# Patient Record
Sex: FEMALE | Race: BLACK OR AFRICAN AMERICAN | NOT HISPANIC OR LATINO | Employment: OTHER | URBAN - METROPOLITAN AREA
[De-identification: names, ages, dates, MRNs, and addresses within clinical notes are randomized per-mention and may not be internally consistent; named-entity substitution may affect disease eponyms.]

---

## 2023-03-29 ENCOUNTER — TELEPHONE (OUTPATIENT)
Dept: PRIMARY CARE | Facility: CLINIC | Age: 48
End: 2023-03-29
Payer: MEDICARE

## 2023-03-29 NOTE — TELEPHONE ENCOUNTER
Pt came into office and dropped off disability paper work, placed in [facility] mailbox 3/29. Pt states she has a deadline of 4/7 for this to be completed.

## 2023-04-26 ENCOUNTER — TELEPHONE (OUTPATIENT)
Dept: PRIMARY CARE | Facility: CLINIC | Age: 48
End: 2023-04-26
Payer: MEDICARE

## 2023-04-26 DIAGNOSIS — M06.9 RHEUMATOID ARTHRITIS INVOLVING BOTH KNEES, UNSPECIFIED WHETHER RHEUMATOID FACTOR PRESENT (MULTI): Primary | ICD-10-CM

## 2023-04-26 PROBLEM — R20.2 NUMBNESS AND TINGLING: Status: ACTIVE | Noted: 2023-04-26

## 2023-04-26 PROBLEM — S89.90XA KNEE INJURY: Status: ACTIVE | Noted: 2023-04-26

## 2023-04-26 PROBLEM — D12.6 ADENOMATOUS POLYP OF COLON: Status: ACTIVE | Noted: 2023-04-26

## 2023-04-26 PROBLEM — M25.561 BILATERAL KNEE PAIN: Status: ACTIVE | Noted: 2023-04-26

## 2023-04-26 PROBLEM — R29.898 WEAKNESS OF BOTH LOWER EXTREMITIES: Status: ACTIVE | Noted: 2023-04-26

## 2023-04-26 PROBLEM — R20.0 NUMBNESS AND TINGLING: Status: ACTIVE | Noted: 2023-04-26

## 2023-04-26 PROBLEM — E55.9 VITAMIN D DEFICIENCY: Status: ACTIVE | Noted: 2023-04-26

## 2023-04-26 PROBLEM — E53.8 VITAMIN B12 DEFICIENCY: Status: ACTIVE | Noted: 2023-04-26

## 2023-04-26 PROBLEM — R63.2 POLYPHAGIA: Status: ACTIVE | Noted: 2023-04-26

## 2023-04-26 PROBLEM — D64.9 ANEMIA: Status: ACTIVE | Noted: 2023-04-26

## 2023-04-26 PROBLEM — M77.32 CALCANEAL SPUR OF LEFT FOOT: Status: ACTIVE | Noted: 2023-04-26

## 2023-04-26 PROBLEM — R09.81 SINUS CONGESTION: Status: ACTIVE | Noted: 2023-04-26

## 2023-04-26 PROBLEM — K52.9 CHRONIC DIARRHEA: Status: ACTIVE | Noted: 2023-04-26

## 2023-04-26 PROBLEM — R29.898 IMPAIRED STRENGTH OF LOWER EXTREMITY: Status: ACTIVE | Noted: 2023-04-26

## 2023-04-26 PROBLEM — T14.8XXA BRUISE: Status: ACTIVE | Noted: 2023-04-26

## 2023-04-26 PROBLEM — F32.A DEPRESSION: Status: ACTIVE | Noted: 2023-04-26

## 2023-04-26 PROBLEM — E66.01 OBESITY, CLASS III, BMI 40-49.9 (MORBID OBESITY) (MULTI): Status: ACTIVE | Noted: 2023-04-26

## 2023-04-26 PROBLEM — R10.811 RIGHT UPPER QUADRANT ABDOMINAL TENDERNESS: Status: ACTIVE | Noted: 2023-04-26

## 2023-04-26 PROBLEM — M70.51 BURSITIS OF RIGHT KNEE: Status: ACTIVE | Noted: 2023-04-26

## 2023-04-26 PROBLEM — E66.813 OBESITY, CLASS III, BMI 40-49.9 (MORBID OBESITY): Status: ACTIVE | Noted: 2023-04-26

## 2023-04-26 PROBLEM — J45.909 ASTHMA (HHS-HCC): Status: ACTIVE | Noted: 2023-04-26

## 2023-04-26 PROBLEM — M79.662 TENDERNESS OF LEFT CALF: Status: ACTIVE | Noted: 2023-04-26

## 2023-04-26 PROBLEM — G89.29 CHRONIC MIDLINE LOW BACK PAIN WITHOUT SCIATICA: Status: ACTIVE | Noted: 2023-04-26

## 2023-04-26 PROBLEM — S83.249A ACUTE MEDIAL MENISCAL TEAR: Status: ACTIVE | Noted: 2023-04-26

## 2023-04-26 PROBLEM — M54.50 CHRONIC MIDLINE LOW BACK PAIN WITHOUT SCIATICA: Status: ACTIVE | Noted: 2023-04-26

## 2023-04-26 PROBLEM — M25.562 BILATERAL KNEE PAIN: Status: ACTIVE | Noted: 2023-04-26

## 2023-04-26 PROBLEM — M25.661 DECREASED RANGE OF MOTION OF RIGHT KNEE: Status: ACTIVE | Noted: 2023-04-26

## 2023-04-26 PROBLEM — R26.9 GAIT ABNORMALITY: Status: ACTIVE | Noted: 2023-04-26

## 2023-04-26 PROBLEM — K57.30 DIVERTICULOSIS OF COLON: Status: ACTIVE | Noted: 2023-04-26

## 2023-04-26 PROBLEM — M76.51 PATELLAR TENDINITIS OF RIGHT KNEE: Status: ACTIVE | Noted: 2023-04-26

## 2023-04-26 RX ORDER — ACETAMINOPHEN, DEXTROMETHORPHAN HBR, DOXYLAMINE SUCCINATE, PHENYLEPHRINE HCL 650; 20; 12.5; 1 MG/30ML; MG/30ML; MG/30ML; MG/30ML
1 SOLUTION ORAL DAILY
COMMUNITY
Start: 2019-04-05 | End: 2023-06-21 | Stop reason: SDUPTHER

## 2023-04-26 RX ORDER — VIT C/E/ZN/COPPR/LUTEIN/ZEAXAN 250MG-90MG
1 CAPSULE ORAL DAILY
COMMUNITY
Start: 2017-03-07 | End: 2023-06-21 | Stop reason: SDUPTHER

## 2023-04-26 RX ORDER — ALBUTEROL SULFATE 90 UG/1
AEROSOL, METERED RESPIRATORY (INHALATION)
COMMUNITY
End: 2023-11-21

## 2023-04-26 RX ORDER — TURMERIC ROOT EXTRACT 500 MG
TABLET ORAL
COMMUNITY

## 2023-04-26 RX ORDER — SARILUMAB 200 MG/1.14ML
INJECTION, SOLUTION SUBCUTANEOUS
COMMUNITY
Start: 2023-01-06 | End: 2023-12-21 | Stop reason: WASHOUT

## 2023-04-26 RX ORDER — DULOXETIN HYDROCHLORIDE 60 MG/1
1 CAPSULE, DELAYED RELEASE ORAL DAILY
COMMUNITY
Start: 2023-03-20

## 2023-04-26 RX ORDER — FOLIC ACID 1 MG/1
1 TABLET ORAL DAILY
COMMUNITY
End: 2023-06-21 | Stop reason: SDUPTHER

## 2023-04-26 RX ORDER — TRAZODONE HYDROCHLORIDE 100 MG/1
TABLET ORAL
COMMUNITY
Start: 2023-03-20

## 2023-05-05 SDOH — ECONOMIC STABILITY: FOOD INSECURITY: WITHIN THE PAST 12 MONTHS, THE FOOD YOU BOUGHT JUST DIDN'T LAST AND YOU DIDN'T HAVE MONEY TO GET MORE.: SOMETIMES TRUE

## 2023-05-05 SDOH — ECONOMIC STABILITY: FOOD INSECURITY: WITHIN THE PAST 12 MONTHS, YOU WORRIED THAT YOUR FOOD WOULD RUN OUT BEFORE YOU GOT MONEY TO BUY MORE.: SOMETIMES TRUE

## 2023-05-18 LAB
ALANINE AMINOTRANSFERASE (SGPT) (U/L) IN SER/PLAS: 11 U/L (ref 7–45)
ALBUMIN (G/DL) IN SER/PLAS: 3.9 G/DL (ref 3.4–5)
ALKALINE PHOSPHATASE (U/L) IN SER/PLAS: 81 U/L (ref 33–110)
ANION GAP IN SER/PLAS: 12 MMOL/L (ref 10–20)
ASPARTATE AMINOTRANSFERASE (SGOT) (U/L) IN SER/PLAS: 13 U/L (ref 9–39)
BASOPHILS (10*3/UL) IN BLOOD BY AUTOMATED COUNT: 0.03 X10E9/L (ref 0–0.1)
BASOPHILS/100 LEUKOCYTES IN BLOOD BY AUTOMATED COUNT: 0.4 % (ref 0–2)
BILIRUBIN TOTAL (MG/DL) IN SER/PLAS: 0.3 MG/DL (ref 0–1.2)
CALCIDIOL (25 OH VITAMIN D3) (NG/ML) IN SER/PLAS: 21 NG/ML
CALCIUM (MG/DL) IN SER/PLAS: 9.4 MG/DL (ref 8.6–10.3)
CARBON DIOXIDE, TOTAL (MMOL/L) IN SER/PLAS: 29 MMOL/L (ref 21–32)
CHLORIDE (MMOL/L) IN SER/PLAS: 103 MMOL/L (ref 98–107)
CHOLESTEROL (MG/DL) IN SER/PLAS: 165 MG/DL (ref 0–199)
CHOLESTEROL IN HDL (MG/DL) IN SER/PLAS: 51.2 MG/DL
CHOLESTEROL/HDL RATIO: 3.2
COBALAMIN (VITAMIN B12) (PG/ML) IN SER/PLAS: 256 PG/ML (ref 211–911)
CREATININE (MG/DL) IN SER/PLAS: 0.74 MG/DL (ref 0.5–1.05)
EOSINOPHILS (10*3/UL) IN BLOOD BY AUTOMATED COUNT: 0.14 X10E9/L (ref 0–0.7)
EOSINOPHILS/100 LEUKOCYTES IN BLOOD BY AUTOMATED COUNT: 1.8 % (ref 0–6)
ERYTHROCYTE DISTRIBUTION WIDTH (RATIO) BY AUTOMATED COUNT: 13.8 % (ref 11.5–14.5)
ERYTHROCYTE MEAN CORPUSCULAR HEMOGLOBIN CONCENTRATION (G/DL) BY AUTOMATED: 31.7 G/DL (ref 32–36)
ERYTHROCYTE MEAN CORPUSCULAR VOLUME (FL) BY AUTOMATED COUNT: 88 FL (ref 80–100)
ERYTHROCYTES (10*6/UL) IN BLOOD BY AUTOMATED COUNT: 4.39 X10E12/L (ref 4–5.2)
GFR FEMALE: >90 ML/MIN/1.73M2
GLUCOSE (MG/DL) IN SER/PLAS: 95 MG/DL (ref 74–99)
HEMATOCRIT (%) IN BLOOD BY AUTOMATED COUNT: 38.5 % (ref 36–46)
HEMOGLOBIN (G/DL) IN BLOOD: 12.2 G/DL (ref 12–16)
IMMATURE GRANULOCYTES/100 LEUKOCYTES IN BLOOD BY AUTOMATED COUNT: 0.3 % (ref 0–0.9)
LDL: 95 MG/DL (ref 0–99)
LEUKOCYTES (10*3/UL) IN BLOOD BY AUTOMATED COUNT: 7.6 X10E9/L (ref 4.4–11.3)
LYMPHOCYTES (10*3/UL) IN BLOOD BY AUTOMATED COUNT: 2.18 X10E9/L (ref 1.2–4.8)
LYMPHOCYTES/100 LEUKOCYTES IN BLOOD BY AUTOMATED COUNT: 28.8 % (ref 13–44)
MONOCYTES (10*3/UL) IN BLOOD BY AUTOMATED COUNT: 0.3 X10E9/L (ref 0.1–1)
MONOCYTES/100 LEUKOCYTES IN BLOOD BY AUTOMATED COUNT: 4 % (ref 2–10)
NEUTROPHILS (10*3/UL) IN BLOOD BY AUTOMATED COUNT: 4.91 X10E9/L (ref 1.2–7.7)
NEUTROPHILS/100 LEUKOCYTES IN BLOOD BY AUTOMATED COUNT: 64.7 % (ref 40–80)
PLATELETS (10*3/UL) IN BLOOD AUTOMATED COUNT: 320 X10E9/L (ref 150–450)
POTASSIUM (MMOL/L) IN SER/PLAS: 3.6 MMOL/L (ref 3.5–5.3)
PROTEIN TOTAL: 7.7 G/DL (ref 6.4–8.2)
SODIUM (MMOL/L) IN SER/PLAS: 140 MMOL/L (ref 136–145)
TRIGLYCERIDE (MG/DL) IN SER/PLAS: 93 MG/DL (ref 0–149)
UREA NITROGEN (MG/DL) IN SER/PLAS: 12 MG/DL (ref 6–23)
VLDL: 19 MG/DL (ref 0–40)

## 2023-05-22 ENCOUNTER — TELEPHONE (OUTPATIENT)
Dept: PRIMARY CARE | Facility: CLINIC | Age: 48
End: 2023-05-22
Payer: MEDICARE

## 2023-05-25 NOTE — TELEPHONE ENCOUNTER
Labs look ok- needs to be more consistent with vitamin D 1000 unit daily supplementation. If already taking it daily needs to take 2 capsules as vitamin D is a little low. Continue with B12 supplement. Everything else looks ok- if Dr. RAY has other issues he can discuss with her upon his return

## 2023-06-13 ENCOUNTER — TELEPHONE (OUTPATIENT)
Dept: PRIMARY CARE | Facility: CLINIC | Age: 48
End: 2023-06-13
Payer: MEDICARE

## 2023-06-13 DIAGNOSIS — E66.01 OBESITY, CLASS III, BMI 40-49.9 (MORBID OBESITY) (MULTI): ICD-10-CM

## 2023-06-13 DIAGNOSIS — M06.9 RHEUMATOID ARTHRITIS INVOLVING BOTH KNEES, UNSPECIFIED WHETHER RHEUMATOID FACTOR PRESENT (MULTI): Primary | ICD-10-CM

## 2023-06-13 NOTE — TELEPHONE ENCOUNTER
Pt called in and is requesting another referral be placed for food for life. Pt states current referral will be expiring.

## 2023-06-20 RX ORDER — HYDROXYCHLOROQUINE SULFATE 200 MG/1
200 TABLET, FILM COATED ORAL 2 TIMES DAILY
COMMUNITY
Start: 2023-02-08

## 2023-06-21 ENCOUNTER — OFFICE VISIT (OUTPATIENT)
Dept: PRIMARY CARE | Facility: CLINIC | Age: 48
End: 2023-06-21
Payer: MEDICARE

## 2023-06-21 ENCOUNTER — LAB (OUTPATIENT)
Dept: LAB | Facility: LAB | Age: 48
End: 2023-06-21
Payer: MEDICARE

## 2023-06-21 VITALS
HEIGHT: 61 IN | BODY MASS INDEX: 45.31 KG/M2 | TEMPERATURE: 96.5 F | DIASTOLIC BLOOD PRESSURE: 82 MMHG | WEIGHT: 240 LBS | HEART RATE: 62 BPM | SYSTOLIC BLOOD PRESSURE: 119 MMHG | OXYGEN SATURATION: 99 %

## 2023-06-21 DIAGNOSIS — E55.9 VITAMIN D DEFICIENCY: ICD-10-CM

## 2023-06-21 DIAGNOSIS — M06.9 RHEUMATOID ARTHRITIS INVOLVING BOTH KNEES, UNSPECIFIED WHETHER RHEUMATOID FACTOR PRESENT (MULTI): ICD-10-CM

## 2023-06-21 DIAGNOSIS — E74.89 OTHER SPECIFIED DISORDERS OF CARBOHYDRATE METABOLISM (MULTI): ICD-10-CM

## 2023-06-21 DIAGNOSIS — M06.9 RHEUMATOID ARTHRITIS, INVOLVING UNSPECIFIED SITE, UNSPECIFIED WHETHER RHEUMATOID FACTOR PRESENT (MULTI): ICD-10-CM

## 2023-06-21 DIAGNOSIS — Z86.39 HISTORY OF MORBID OBESITY: ICD-10-CM

## 2023-06-21 DIAGNOSIS — E53.8 VITAMIN B12 DEFICIENCY: ICD-10-CM

## 2023-06-21 DIAGNOSIS — E66.01 OBESITY, CLASS III, BMI 40-49.9 (MORBID OBESITY) (MULTI): Primary | ICD-10-CM

## 2023-06-21 DIAGNOSIS — E66.01 OBESITY, CLASS III, BMI 40-49.9 (MORBID OBESITY) (MULTI): ICD-10-CM

## 2023-06-21 LAB
ALANINE AMINOTRANSFERASE (SGPT) (U/L) IN SER/PLAS: 13 U/L (ref 7–45)
ALBUMIN (G/DL) IN SER/PLAS: 4 G/DL (ref 3.4–5)
ALBUMIN (MG/L) IN URINE: 22.5 MG/L
ALBUMIN/CREATININE (UG/MG) IN URINE: 16.1 UG/MG CRT (ref 0–30)
ALKALINE PHOSPHATASE (U/L) IN SER/PLAS: 90 U/L (ref 33–110)
ANION GAP IN SER/PLAS: 11 MMOL/L (ref 10–20)
ASPARTATE AMINOTRANSFERASE (SGOT) (U/L) IN SER/PLAS: 14 U/L (ref 9–39)
BILIRUBIN TOTAL (MG/DL) IN SER/PLAS: 0.3 MG/DL (ref 0–1.2)
CALCIUM (MG/DL) IN SER/PLAS: 9.2 MG/DL (ref 8.6–10.3)
CARBON DIOXIDE, TOTAL (MMOL/L) IN SER/PLAS: 27 MMOL/L (ref 21–32)
CHLORIDE (MMOL/L) IN SER/PLAS: 106 MMOL/L (ref 98–107)
CREATININE (MG/DL) IN SER/PLAS: 0.73 MG/DL (ref 0.5–1.05)
CREATININE (MG/DL) IN URINE: 140 MG/DL (ref 20–320)
GFR FEMALE: >90 ML/MIN/1.73M2
GLUCOSE (MG/DL) IN SER/PLAS: 89 MG/DL (ref 74–99)
POTASSIUM (MMOL/L) IN SER/PLAS: 4 MMOL/L (ref 3.5–5.3)
PROTEIN TOTAL: 7.2 G/DL (ref 6.4–8.2)
SODIUM (MMOL/L) IN SER/PLAS: 140 MMOL/L (ref 136–145)
THYROTROPIN (MIU/L) IN SER/PLAS BY DETECTION LIMIT <= 0.05 MIU/L: 1.56 MIU/L (ref 0.44–3.98)
UREA NITROGEN (MG/DL) IN SER/PLAS: 11 MG/DL (ref 6–23)

## 2023-06-21 PROCEDURE — 82570 ASSAY OF URINE CREATININE: CPT

## 2023-06-21 PROCEDURE — 36415 COLL VENOUS BLD VENIPUNCTURE: CPT

## 2023-06-21 PROCEDURE — 80053 COMPREHEN METABOLIC PANEL: CPT

## 2023-06-21 PROCEDURE — 84443 ASSAY THYROID STIM HORMONE: CPT

## 2023-06-21 PROCEDURE — 99214 OFFICE O/P EST MOD 30 MIN: CPT | Performed by: INTERNAL MEDICINE

## 2023-06-21 PROCEDURE — 83036 HEMOGLOBIN GLYCOSYLATED A1C: CPT

## 2023-06-21 PROCEDURE — 82043 UR ALBUMIN QUANTITATIVE: CPT

## 2023-06-21 PROCEDURE — 82652 VIT D 1 25-DIHYDROXY: CPT

## 2023-06-21 PROCEDURE — 3008F BODY MASS INDEX DOCD: CPT | Performed by: INTERNAL MEDICINE

## 2023-06-21 RX ORDER — SEMAGLUTIDE 0.25 MG/.5ML
0.25 INJECTION, SOLUTION SUBCUTANEOUS
Qty: 2 ML | Refills: 3 | Status: SHIPPED | OUTPATIENT
Start: 2023-06-21 | End: 2023-12-21 | Stop reason: RX

## 2023-06-21 RX ORDER — VIT C/E/ZN/COPPR/LUTEIN/ZEAXAN 250MG-90MG
25 CAPSULE ORAL DAILY
Qty: 30 CAPSULE | Refills: 3 | Status: SHIPPED | OUTPATIENT
Start: 2023-06-21 | End: 2023-12-21 | Stop reason: SDUPTHER

## 2023-06-21 RX ORDER — FOLIC ACID 1 MG/1
1 TABLET ORAL DAILY
Qty: 30 TABLET | Refills: 3 | Status: SHIPPED | OUTPATIENT
Start: 2023-06-21 | End: 2023-12-21 | Stop reason: SDUPTHER

## 2023-06-21 RX ORDER — ACETAMINOPHEN, DEXTROMETHORPHAN HBR, DOXYLAMINE SUCCINATE, PHENYLEPHRINE HCL 650; 20; 12.5; 1 MG/30ML; MG/30ML; MG/30ML; MG/30ML
1 SOLUTION ORAL DAILY
Qty: 30 MCG | Refills: 3 | Status: SHIPPED | OUTPATIENT
Start: 2023-06-21 | End: 2023-12-21 | Stop reason: SDUPTHER

## 2023-06-21 SDOH — ECONOMIC STABILITY: FOOD INSECURITY: WITHIN THE PAST 12 MONTHS, YOU WORRIED THAT YOUR FOOD WOULD RUN OUT BEFORE YOU GOT MONEY TO BUY MORE.: NEVER TRUE

## 2023-06-21 SDOH — ECONOMIC STABILITY: FOOD INSECURITY: WITHIN THE PAST 12 MONTHS, THE FOOD YOU BOUGHT JUST DIDN'T LAST AND YOU DIDN'T HAVE MONEY TO GET MORE.: NEVER TRUE

## 2023-06-21 ASSESSMENT — PATIENT HEALTH QUESTIONNAIRE - PHQ9
7. TROUBLE CONCENTRATING ON THINGS, SUCH AS READING THE NEWSPAPER OR WATCHING TELEVISION: MORE THAN HALF THE DAYS
2. FEELING DOWN, DEPRESSED OR HOPELESS: SEVERAL DAYS
1. LITTLE INTEREST OR PLEASURE IN DOING THINGS: MORE THAN HALF THE DAYS
10. IF YOU CHECKED OFF ANY PROBLEMS, HOW DIFFICULT HAVE THESE PROBLEMS MADE IT FOR YOU TO DO YOUR WORK, TAKE CARE OF THINGS AT HOME, OR GET ALONG WITH OTHER PEOPLE: VERY DIFFICULT
5. POOR APPETITE OR OVEREATING: MORE THAN HALF THE DAYS
6. FEELING BAD ABOUT YOURSELF - OR THAT YOU ARE A FAILURE OR HAVE LET YOURSELF OR YOUR FAMILY DOWN: MORE THAN HALF THE DAYS
3. TROUBLE FALLING OR STAYING ASLEEP: MORE THAN HALF THE DAYS
SUM OF ALL RESPONSES TO PHQ QUESTIONS 1-9: 17
9. THOUGHTS THAT YOU WOULD BE BETTER OFF DEAD, OR OF HURTING YOURSELF: MORE THAN HALF THE DAYS
8. MOVING OR SPEAKING SO SLOWLY THAT OTHER PEOPLE COULD HAVE NOTICED. OR THE OPPOSITE, BEING SO FIGETY OR RESTLESS THAT YOU HAVE BEEN MOVING AROUND A LOT MORE THAN USUAL: MORE THAN HALF THE DAYS
SUM OF ALL RESPONSES TO PHQ9 QUESTIONS 1 & 2: 3
4. FEELING TIRED OR HAVING LITTLE ENERGY: MORE THAN HALF THE DAYS

## 2023-06-21 ASSESSMENT — LIFESTYLE VARIABLES
HOW OFTEN DO YOU HAVE A DRINK CONTAINING ALCOHOL: NEVER
SKIP TO QUESTIONS 9-10: 1
AUDIT-C TOTAL SCORE: 0
HOW OFTEN DO YOU HAVE SIX OR MORE DRINKS ON ONE OCCASION: NEVER
HOW MANY STANDARD DRINKS CONTAINING ALCOHOL DO YOU HAVE ON A TYPICAL DAY: PATIENT DOES NOT DRINK

## 2023-06-21 NOTE — PROGRESS NOTES
"Chief Complaint/HPI:    right knee pain: Following with Dr. Harmon. Has had multiple injections. Plan to do arthroplasty after patient's BMI <40. However, they are hesitant due to patient's age. She is saving up to try \"gene therapy\" in [place]. Taking Tylenol for pain. She has seen pain management. Has been through PT. She has silver sneakers and goes to the gym when the weather is better. Patient continues to have knee pain. She does go to nutritionist. \"being in pain sucks\"     RA: patient is taking Plaquenil per Dr Lundy now, is taking folate also.  She also has osteoarthritis, she also has sarcoidosis apparently, patient sees orthopedics also     Obesity: has been trying to lose weight, she has ongoing knee pain, weight has been stable. She is eating better with the [employer] program through . She has lost 6#, she does wonder about weight loss meds, she has been trying to lose more weight       Depression:  She is going to [facility], sees Alyssa Avitia        Mammogram: 11/9/22  OB/GYN: Dr. Gonzalez    ROS otherwise negative aside from what was mentioned above in HPI.      Patient Active Problem List   Diagnosis    Acute medial meniscal tear    Adenomatous polyp of colon    Anemia    Arthritis or polyarthritis, rheumatoid (CMS/HCC)    Asthma    Bilateral knee pain    Bruise    Bursitis of right knee    Calcaneal spur of left foot    Chronic diarrhea    Chronic midline low back pain without sciatica    Decreased range of motion of right knee    Depression    Diverticulosis of colon    Gait abnormality    Impaired strength of lower extremity    Knee injury    Numbness and tingling    Obesity, Class III, BMI 40-49.9 (morbid obesity) (CMS/HCC)    Patellar tendinitis of right knee    Polyphagia    Rheumatoid arthritis involving both knees (CMS/HCC)    Right upper quadrant abdominal tenderness    Sinus congestion    Tenderness of left calf    Vitamin B12 deficiency    Vitamin D deficiency    Weakness of both " lower extremities         Past Medical History:   Diagnosis Date    Acute medial meniscal tear 04/26/2023    Adenomatous polyp of colon 04/26/2023    Anemia 04/26/2023    Arthritis or polyarthritis, rheumatoid (CMS/[facility]) 04/26/2023    Asthma 04/26/2023    Bilateral knee pain 04/26/2023    Bruise 04/26/2023    Bursitis of right knee 04/26/2023    Calcaneal spur of left foot 04/26/2023    Chronic diarrhea 04/26/2023    Chronic midline low back pain without sciatica 04/26/2023    Decreased range of motion of right knee 04/26/2023    Depression 04/26/2023    Diverticulosis of colon 04/26/2023    Gait abnormality 04/26/2023    Impaired strength of lower extremity 04/26/2023    Knee injury 04/26/2023    Numbness and tingling 04/26/2023    Obesity, Class III, BMI 40-49.9 (morbid obesity) (CMS/[facility]) 04/26/2023    Patellar tendinitis of right knee 04/26/2023    Polyphagia 04/26/2023    Rheumatoid arthritis involving both knees (CMS/[facility]) 04/26/2023    Right upper quadrant abdominal tenderness 04/26/2023    Sinus congestion 04/26/2023    Tenderness of left calf 04/26/2023    Vitamin B12 deficiency 04/26/2023    Vitamin D deficiency 04/26/2023    Weakness of both lower extremities 04/26/2023     Past Surgical History:   Procedure Laterality Date    CHOLECYSTECTOMY  09/23/2016    Cholecystectomy    DILATION AND CURETTAGE OF UTERUS  09/23/2016    Dilation And Curettage    OTHER SURGICAL HISTORY  09/23/2016    Ear Surgery    OTHER SURGICAL HISTORY  09/23/2016    Oral Surgery    OTHER SURGICAL HISTORY  06/25/2020    Knee arthroscopy     Social History     Social History Narrative    Not on file         ALLERGIES  Naproxen, Sulfamethoxazole-trimethoprim, Watermelon, Meperidine, and Promethazine      MEDICATIONS  Current Outpatient Medications on File Prior to Visit   Medication Sig Dispense Refill    albuterol 90 mcg/actuation inhaler INHALE 1 (ONE) PUFF BY MOUTH EVERY FOUR HOURS AS NEEDED.      DULoxetine (Cymbalta) 60 mg DR capsule  "Take 1 capsule (60 mg) by mouth once daily. Unknown of up dosage      hydroxychloroquine (Plaquenil) 200 mg tablet Take 1 tablet (200 mg) by mouth 2 times a day.      traZODone (Desyrel) 100 mg tablet TAKE 1/2 (ONE-HALF) TO 1 (ONE) TABLET BY MOUTH FOR HEADACHE AS NEEDED for sleep      turmeric root extract 500 mg tablet Take by mouth.      [DISCONTINUED] cholecalciferol (Vitamin D-3) 25 MCG (1000 UT) capsule Take 1 capsule (25 mcg) by mouth once daily.      [DISCONTINUED] cyanocobalamin, vitamin B-12, (Vitamin B-12) 1,000 mcg tablet extended release Take 1,000 mcg by mouth once daily.      [DISCONTINUED] folic acid (Folvite) 1 mg tablet Take 1 tablet (1 mg) by mouth once daily.      Kevzara 200 mg/1.14 mL injection        No current facility-administered medications on file prior to visit.         PHYSICAL EXAM  /82 (BP Location: Right arm, Patient Position: Sitting, BP Cuff Size: Adult)   Pulse 62   Temp 35.8 °C (96.5 °F) (Temporal)   Ht 1.549 m (5' 1\")   Wt 109 kg (240 lb)   SpO2 99%   BMI 45.35 kg/m²   Body mass index is 45.35 kg/m².  CONSTITUTIONAL - morbidly obese, well nourished, well developed, looks like stated age, in no acute distress, not ill-appearing, and not tired appearing  HEAD - no trauma, normocephalic  EYES - extraocular muscles are intact, and normal external exam  NECK - supple without rigidity, no neck mass was observed, no thyromegaly or thyroid nodules  CHEST - clear to auscultation, no wheezing, no crackles and no rales, good effort  CARDIAC - regular rate and regular rhythm, no skipped beats, no murmur  EXTREMITIES - no edema, no deformities  NEUROLOGICAL - alert, oriented and no focal signs  PSYCHIATRIC - mood congruent with affect, fair eye contact, insight and judgement intact; alert, pleasant and cordial, age-appropriate  IMMUNOLOGIC - no cervical lymphadenopathy    ASSESSMENT/PLAN  Problem List Items Addressed This Visit       Arthritis or polyarthritis, rheumatoid " (CMS/[facility])    Current Assessment & Plan     Patient sees rheumatology, no med changes, continue to encourage the weight loss         Obesity, Class III, BMI 40-49.9 (morbid obesity) (CMS/[facility]) - Primary    Current Assessment & Plan     Trial of Wegovy ordered for the patient, will see if it helpful         Relevant Medications    semaglutide, weight loss, (Wegovy) 0.25 mg/0.5 mL pen injector    Other Relevant Orders    Referral to [facility]    Rheumatoid arthritis involving both knees (CMS/[facility])    Vitamin B12 deficiency    Relevant Medications    cyanocobalamin, vitamin B-12, (Vitamin B-12) 1,000 mcg tablet extended release    folic acid (Folvite) 1 mg tablet    Vitamin D deficiency    Relevant Medications    cholecalciferol (Vitamin D-3) 25 MCG (1000 UT) capsule    cyanocobalamin, vitamin B-12, (Vitamin B-12) 1,000 mcg tablet extended release    folic acid (Folvite) 1 mg tablet     Try the Wegovy, notify the office if it is not tolerated, [facility] may help the patient obtain prior authorization if needed  Serge Marie MD

## 2023-06-21 NOTE — TELEPHONE ENCOUNTER
Called pt, pt stated referral is already . Pt states new referral has to be placed every 6 months and she was told by the food for life program that she is needing a new referral in order to continue the program.

## 2023-06-22 LAB
ESTIMATED AVERAGE GLUCOSE FOR HBA1C: 131 MG/DL
HEMOGLOBIN A1C/HEMOGLOBIN TOTAL IN BLOOD: 6.2 %

## 2023-06-26 LAB — VITAMIN D 1,25-DIHYDROXY: 65.7 PG/ML (ref 19.9–79.3)

## 2023-09-23 PROBLEM — M79.672 CHRONIC FOOT PAIN, LEFT: Status: ACTIVE | Noted: 2023-09-23

## 2023-09-23 PROBLEM — Z72.4 INAPPROPRIATE DIET AND EATING HABITS: Status: ACTIVE | Noted: 2023-09-23

## 2023-09-23 PROBLEM — M25.661 DECREASED RANGE OF MOTION OF BOTH KNEES: Status: ACTIVE | Noted: 2023-09-23

## 2023-09-23 PROBLEM — G89.29 CHRONIC FOOT PAIN, LEFT: Status: ACTIVE | Noted: 2023-09-23

## 2023-09-23 PROBLEM — E66.9 OBESITY (BMI 35.0-39.9 WITHOUT COMORBIDITY): Status: ACTIVE | Noted: 2021-11-08

## 2023-09-23 PROBLEM — J45.20 MILD INTERMITTENT ASTHMA WITHOUT COMPLICATION (HHS-HCC): Status: ACTIVE | Noted: 2018-04-24

## 2023-09-23 PROBLEM — M06.041 RHEUMATOID ARTHRITIS INVOLVING BOTH HANDS WITH NEGATIVE RHEUMATOID FACTOR (MULTI): Status: ACTIVE | Noted: 2018-04-24

## 2023-09-23 PROBLEM — M76.61 ACHILLES TENDINITIS OF BOTH LOWER EXTREMITIES: Status: ACTIVE | Noted: 2018-02-23

## 2023-09-23 PROBLEM — L60.8 DISCOLORATION OF NAILBEDS: Status: ACTIVE | Noted: 2018-02-23

## 2023-09-23 PROBLEM — M06.042 RHEUMATOID ARTHRITIS INVOLVING BOTH HANDS WITH NEGATIVE RHEUMATOID FACTOR (MULTI): Status: ACTIVE | Noted: 2018-04-24

## 2023-09-23 PROBLEM — M17.0 PRIMARY OSTEOARTHRITIS OF BOTH KNEES: Status: ACTIVE | Noted: 2018-04-24

## 2023-09-23 PROBLEM — M62.81 TRUNCAL MUSCLE WEAKNESS: Status: ACTIVE | Noted: 2023-09-23

## 2023-09-23 PROBLEM — M76.62 ACHILLES TENDINITIS OF BOTH LOWER EXTREMITIES: Status: ACTIVE | Noted: 2018-02-23

## 2023-09-23 PROBLEM — M25.662 DECREASED RANGE OF MOTION OF BOTH KNEES: Status: ACTIVE | Noted: 2023-09-23

## 2023-09-23 RX ORDER — DULOXETIN HYDROCHLORIDE 30 MG/1
30 CAPSULE, DELAYED RELEASE ORAL DAILY
COMMUNITY

## 2023-10-23 ENCOUNTER — LAB (OUTPATIENT)
Dept: LAB | Facility: LAB | Age: 48
End: 2023-10-23
Payer: MEDICARE

## 2023-10-23 ENCOUNTER — OFFICE VISIT (OUTPATIENT)
Dept: OBSTETRICS AND GYNECOLOGY | Facility: CLINIC | Age: 48
End: 2023-10-23
Payer: MEDICARE

## 2023-10-23 ENCOUNTER — TELEPHONE (OUTPATIENT)
Dept: PRIMARY CARE | Facility: CLINIC | Age: 48
End: 2023-10-23

## 2023-10-23 VITALS
HEIGHT: 62 IN | BODY MASS INDEX: 45.45 KG/M2 | SYSTOLIC BLOOD PRESSURE: 126 MMHG | DIASTOLIC BLOOD PRESSURE: 70 MMHG | WEIGHT: 247 LBS

## 2023-10-23 DIAGNOSIS — Z12.31 SCREENING MAMMOGRAM FOR BREAST CANCER: Primary | ICD-10-CM

## 2023-10-23 DIAGNOSIS — Z01.411 ENCNTR FOR GYN EXAM (GENERAL) (ROUTINE) W ABNORMAL FINDINGS: ICD-10-CM

## 2023-10-23 DIAGNOSIS — R63.5 WEIGHT GAIN: ICD-10-CM

## 2023-10-23 DIAGNOSIS — N95.1 MENOPAUSAL SYMPTOMS: ICD-10-CM

## 2023-10-23 DIAGNOSIS — R09.81 SINUS CONGESTION: Primary | ICD-10-CM

## 2023-10-23 LAB
ESTRADIOL SERPL-MCNC: <19 PG/ML
FSH SERPL-ACNC: 21 IU/L
PROGEST SERPL-MCNC: <0.3 NG/ML
TSH SERPL-ACNC: 1.39 MIU/L (ref 0.44–3.98)

## 2023-10-23 PROCEDURE — 36415 COLL VENOUS BLD VENIPUNCTURE: CPT

## 2023-10-23 PROCEDURE — 82670 ASSAY OF TOTAL ESTRADIOL: CPT

## 2023-10-23 PROCEDURE — 99213 OFFICE O/P EST LOW 20 MIN: CPT | Performed by: NURSE PRACTITIONER

## 2023-10-23 PROCEDURE — 99396 PREV VISIT EST AGE 40-64: CPT | Performed by: NURSE PRACTITIONER

## 2023-10-23 PROCEDURE — 84144 ASSAY OF PROGESTERONE: CPT

## 2023-10-23 PROCEDURE — 1036F TOBACCO NON-USER: CPT | Performed by: NURSE PRACTITIONER

## 2023-10-23 PROCEDURE — 83001 ASSAY OF GONADOTROPIN (FSH): CPT

## 2023-10-23 PROCEDURE — 3008F BODY MASS INDEX DOCD: CPT | Performed by: NURSE PRACTITIONER

## 2023-10-23 PROCEDURE — 84443 ASSAY THYROID STIM HORMONE: CPT

## 2023-10-23 RX ORDER — AMOXICILLIN AND CLAVULANATE POTASSIUM 875; 125 MG/1; MG/1
875 TABLET, FILM COATED ORAL 2 TIMES DAILY
Qty: 20 TABLET | Refills: 0 | Status: SHIPPED | OUTPATIENT
Start: 2023-10-23 | End: 2023-11-02

## 2023-10-23 ASSESSMENT — ENCOUNTER SYMPTOMS
RESPIRATORY NEGATIVE: 1
GASTROINTESTINAL NEGATIVE: 1
CARDIOVASCULAR NEGATIVE: 1
CONSTITUTIONAL NEGATIVE: 1
PSYCHIATRIC NEGATIVE: 1
MUSCULOSKELETAL NEGATIVE: 1
EYES NEGATIVE: 1
ENDOCRINE NEGATIVE: 1
NEUROLOGICAL NEGATIVE: 1

## 2023-10-23 NOTE — PROGRESS NOTES
Subjective   Patient ID: Alexander Marin is a 48 y.o. female who presents for Annual Exam (Reviewing EMR indicates normal PAP /HPV 10/19/2022).  48 year old here for annual exam with complaints of having weight gain, hot flashes.  She notes that she has been having weight fluctuations where she has been having weight go up 7 pounds and then down 6 pounds but she can not get the weight to go down permanently.  She is due for her pap in 2025 as her last pap was neg in 2022.  She denies any bleeding as she stopped having periods at age 40 or 41.  She is due for her mammogram in November.          Review of Systems   Constitutional: Negative.    HENT: Negative.     Eyes: Negative.    Respiratory: Negative.     Cardiovascular: Negative.    Gastrointestinal: Negative.    Endocrine: Negative.    Genitourinary: Negative.    Musculoskeletal: Negative.    Skin: Negative.    Neurological: Negative.    Psychiatric/Behavioral: Negative.         Objective   Physical Exam  Vitals reviewed.   Constitutional:       Appearance: Normal appearance. She is well-developed.   Pulmonary:      Effort: Pulmonary effort is normal. No respiratory distress.   Chest:   Breasts:     Breasts are symmetrical.      Right: Normal. No swelling, bleeding, inverted nipple, mass, nipple discharge, skin change or tenderness.      Left: Normal. No swelling, bleeding, inverted nipple, mass, nipple discharge, skin change or tenderness.   Abdominal:      Palpations: Abdomen is soft.   Genitourinary:     General: Normal vulva.      Exam position: Lithotomy position.      Pubic Area: No rash.       Labia:         Right: No rash, tenderness, lesion or injury.         Left: No rash, tenderness, lesion or injury.       Urethra: No prolapse, urethral pain, urethral swelling or urethral lesion.      Vagina: Normal.      Cervix: Normal.      Uterus: Normal.       Adnexa: Right adnexa normal and left adnexa normal.      Rectum: Normal.   Musculoskeletal:          General: Normal range of motion.   Lymphadenopathy:      Upper Body:      Right upper body: No supraclavicular, axillary or pectoral adenopathy.      Left upper body: No supraclavicular, axillary or pectoral adenopathy.   Skin:     General: Skin is warm and dry.   Neurological:      General: No focal deficit present.      Mental Status: She is alert and oriented to person, place, and time. Mental status is at baseline.   Psychiatric:         Attention and Perception: Attention and perception normal.         Mood and Affect: Mood and affect normal.         Speech: Speech normal.         Behavior: Behavior normal. Behavior is cooperative.         Thought Content: Thought content normal.         Judgment: Judgment normal.         Assessment/Plan   Problem List Items Addressed This Visit             ICD-10-CM    Encntr for gyn exam (general) (routine) w abnormal findings Z01.411     Other Visit Diagnoses         Codes    Screening mammogram for breast cancer    -  Primary Z12.31    Relevant Orders    BI mammo bilateral screening tomosynthesis    Menopausal symptoms     N95.1    Relevant Orders    Follicle Stimulating Hormone    Estradiol    TSH with reflex to Free T4 if abnormal    Progesterone    Weight gain     R63.5    Relevant Orders    TSH with reflex to Free T4 if abnormal        ANNUAL:  Pap/hpv due 2025  Mammogram ordered  MENOPAUSE/WEIGHT GAIN:  FSH, estradiol, TSH with reflex t4, progesterone ordered  Information provided about endocrinology/dietician weight loss program  Follow up 1 year or as needed

## 2023-10-23 NOTE — TELEPHONE ENCOUNTER
How long have you been sick? 4 days   Cough (productive or nonproductive)? Yes   Color of phlegm? Yes, Clear  Sinus pain? Yes   Sinus drainage/color? Yes, clear   Earache? No   Congestion?Yes   Headache? Yes  Fever (if yes, temp)? No   Sore throat? No   Short of breath/wheezing? No   OTC medication? Tylenol for head ache and robitussin cough syrup   LOV: 6/21/23  NOV:12/21/23    [facility] in [place]  Patient would like to know if Dr. RAY can write a note so she can get her money back for a flight due to being sick.

## 2023-11-20 ENCOUNTER — TELEPHONE (OUTPATIENT)
Dept: PRIMARY CARE | Facility: CLINIC | Age: 48
End: 2023-11-20
Payer: MEDICARE

## 2023-11-20 NOTE — TELEPHONE ENCOUNTER
Patient called in stating that she needs a referral to the dietician. Patient states she needs a refill on her nebulizer solution however it has never been called in before. Patient also states that she needs a doctors note to [employer] about her being sick from 10/19/23- present. Please advise.

## 2023-11-21 DIAGNOSIS — J45.909 UNSPECIFIED ASTHMA, UNCOMPLICATED (HHS-HCC): ICD-10-CM

## 2023-11-21 RX ORDER — IBUPROFEN 600 MG/1
600 TABLET ORAL
COMMUNITY
Start: 2023-11-19 | End: 2023-12-21 | Stop reason: WASHOUT

## 2023-11-21 RX ORDER — ALBUTEROL SULFATE 90 UG/1
AEROSOL, METERED RESPIRATORY (INHALATION)
Qty: 8.5 G | Refills: 5 | Status: SHIPPED | OUTPATIENT
Start: 2023-11-21 | End: 2024-05-29

## 2023-12-01 ENCOUNTER — APPOINTMENT (OUTPATIENT)
Dept: RADIOLOGY | Facility: HOSPITAL | Age: 48
End: 2023-12-01
Payer: MEDICARE

## 2023-12-04 ENCOUNTER — TELEPHONE (OUTPATIENT)
Dept: PRIMARY CARE | Facility: CLINIC | Age: 48
End: 2023-12-04
Payer: MEDICARE

## 2023-12-04 DIAGNOSIS — E66.01 OBESITY, CLASS III, BMI 40-49.9 (MORBID OBESITY) (MULTI): ICD-10-CM

## 2023-12-04 DIAGNOSIS — Z72.4 INAPPROPRIATE DIET AND EATING HABITS: Primary | ICD-10-CM

## 2023-12-07 ENCOUNTER — TELEPHONE (OUTPATIENT)
Dept: PRIMARY CARE | Facility: CLINIC | Age: 48
End: 2023-12-07
Payer: MEDICARE

## 2023-12-07 DIAGNOSIS — J45.909 UNSPECIFIED ASTHMA, UNCOMPLICATED (HHS-HCC): ICD-10-CM

## 2023-12-07 NOTE — TELEPHONE ENCOUNTER
Pt's home health nurse called to ask if pt could get a refill on albuteral nebulizer solution, but I don't see that on her medication list. I see the inhaler, but that isn't what she wanted. Nurse said its been a long time since she's gotten it filled. So she just wanted me to put a note back to see if you can go over it with her at her NOV 12/21 to see if she even still needs it.

## 2023-12-20 RX ORDER — ALBUTEROL SULFATE 0.83 MG/ML
2.5 SOLUTION RESPIRATORY (INHALATION) 4 TIMES DAILY PRN
Qty: 180 ML | Refills: 3 | Status: SHIPPED | OUTPATIENT
Start: 2023-12-20 | End: 2024-03-20

## 2023-12-21 ENCOUNTER — OFFICE VISIT (OUTPATIENT)
Dept: PRIMARY CARE | Facility: CLINIC | Age: 48
End: 2023-12-21
Payer: MEDICARE

## 2023-12-21 VITALS
OXYGEN SATURATION: 98 % | DIASTOLIC BLOOD PRESSURE: 85 MMHG | RESPIRATION RATE: 16 BRPM | WEIGHT: 247 LBS | HEART RATE: 117 BPM | TEMPERATURE: 97.4 F | HEIGHT: 62 IN | BODY MASS INDEX: 45.45 KG/M2 | SYSTOLIC BLOOD PRESSURE: 129 MMHG

## 2023-12-21 DIAGNOSIS — J44.9 CHRONIC OBSTRUCTIVE PULMONARY DISEASE, UNSPECIFIED COPD TYPE (MULTI): ICD-10-CM

## 2023-12-21 DIAGNOSIS — F33.9 DEPRESSION, RECURRENT (CMS-HCC): ICD-10-CM

## 2023-12-21 DIAGNOSIS — D68.9 COAGULATION DEFECT, UNSPECIFIED (MULTI): ICD-10-CM

## 2023-12-21 DIAGNOSIS — E53.8 VITAMIN B12 DEFICIENCY: ICD-10-CM

## 2023-12-21 DIAGNOSIS — E55.9 VITAMIN D DEFICIENCY: ICD-10-CM

## 2023-12-21 DIAGNOSIS — E66.01 OBESITY, CLASS III, BMI 40-49.9 (MORBID OBESITY) (MULTI): Primary | ICD-10-CM

## 2023-12-21 DIAGNOSIS — R73.03 PREDIABETES: ICD-10-CM

## 2023-12-21 PROCEDURE — 3008F BODY MASS INDEX DOCD: CPT | Performed by: INTERNAL MEDICINE

## 2023-12-21 PROCEDURE — 99214 OFFICE O/P EST MOD 30 MIN: CPT | Performed by: INTERNAL MEDICINE

## 2023-12-21 PROCEDURE — 1036F TOBACCO NON-USER: CPT | Performed by: INTERNAL MEDICINE

## 2023-12-21 RX ORDER — TIRZEPATIDE 2.5 MG/.5ML
2.5 INJECTION, SOLUTION SUBCUTANEOUS
Qty: 2 ML | Refills: 2 | Status: SHIPPED | OUTPATIENT
Start: 2023-12-21 | End: 2024-04-03

## 2023-12-21 RX ORDER — VIT C/E/ZN/COPPR/LUTEIN/ZEAXAN 250MG-90MG
25 CAPSULE ORAL DAILY
Qty: 90 CAPSULE | Refills: 1 | Status: SHIPPED | OUTPATIENT
Start: 2023-12-21

## 2023-12-21 RX ORDER — FOLIC ACID 1 MG/1
1 TABLET ORAL DAILY
Qty: 90 TABLET | Refills: 1 | Status: SHIPPED | OUTPATIENT
Start: 2023-12-21

## 2023-12-21 RX ORDER — ACETAMINOPHEN, DEXTROMETHORPHAN HBR, DOXYLAMINE SUCCINATE, PHENYLEPHRINE HCL 650; 20; 12.5; 1 MG/30ML; MG/30ML; MG/30ML; MG/30ML
1 SOLUTION ORAL DAILY
Qty: 90 TABLET | Refills: 1 | Status: SHIPPED | OUTPATIENT
Start: 2023-12-21

## 2023-12-21 SDOH — ECONOMIC STABILITY: FOOD INSECURITY: WITHIN THE PAST 12 MONTHS, YOU WORRIED THAT YOUR FOOD WOULD RUN OUT BEFORE YOU GOT MONEY TO BUY MORE.: NEVER TRUE

## 2023-12-21 SDOH — ECONOMIC STABILITY: FOOD INSECURITY: WITHIN THE PAST 12 MONTHS, THE FOOD YOU BOUGHT JUST DIDN'T LAST AND YOU DIDN'T HAVE MONEY TO GET MORE.: NEVER TRUE

## 2023-12-21 ASSESSMENT — LIFESTYLE VARIABLES
HOW OFTEN DO YOU HAVE A DRINK CONTAINING ALCOHOL: NEVER
HOW OFTEN DO YOU HAVE SIX OR MORE DRINKS ON ONE OCCASION: NEVER
SKIP TO QUESTIONS 9-10: 1
AUDIT-C TOTAL SCORE: 0
HOW MANY STANDARD DRINKS CONTAINING ALCOHOL DO YOU HAVE ON A TYPICAL DAY: PATIENT DOES NOT DRINK

## 2023-12-21 ASSESSMENT — PATIENT HEALTH QUESTIONNAIRE - PHQ9
10. IF YOU CHECKED OFF ANY PROBLEMS, HOW DIFFICULT HAVE THESE PROBLEMS MADE IT FOR YOU TO DO YOUR WORK, TAKE CARE OF THINGS AT HOME, OR GET ALONG WITH OTHER PEOPLE: SOMEWHAT DIFFICULT
2. FEELING DOWN, DEPRESSED OR HOPELESS: SEVERAL DAYS
SUM OF ALL RESPONSES TO PHQ9 QUESTIONS 1 & 2: 2
1. LITTLE INTEREST OR PLEASURE IN DOING THINGS: SEVERAL DAYS

## 2023-12-21 NOTE — PROGRESS NOTES
"Subjective   Patient ID: Alexander Marin is a 48 y.o. female who presents for Follow-up (Pt's knees are bothering her still - states they are an ongoing issue that dr. RAY is aware of./Would like to go over test results as well/Weight loss is still a struggle. She does a 30 minute workout. She cut out pop and does see a nutritionist. She doesn't want to go the surgery route/She is concerned about her HR due to CHF running in her family).    HPI  right knee pain: Following with Dr. Harmon. Has had multiple injections. Plan to do arthroplasty after patient's BMI <40. However, they are hesitant due to patient's age. Taking Tylenol for pain. She has seen pain management. Has been through PT, which did help. She does home exercises that she learned at PT.  Heating pads seem to alleviate her pain as well.     Obesity/weight loss: has been trying to lose weight. She states that this is the most she has ever weighed. Patient was given a prescription for Wegovy, however it was \"on back order\" and she has not received it. She does follow with a nutritionist, follow up in January. The patient does walk for 30 minutes daily, though swimming seems to do better for her knees. No longer drinking sodas.  Patient is on food for life program. Her weight continues to fluctuate    Prediabetes: Last A1c was 6.2 on 6/21/23  Denies any polyuria, polydipsia, polyphagia, vision changes or neuropathy      RA/Sarcoidosis arthritis: patient is taking Plaquenil per Dr Lundy now, is taking folate also. No longer on Kevzara injections. The current treatment seems to be working. Sees Dr. Lundy in March 2024.      Asthma: Currently uses her albuterol inhaler as needed. She is inquiring if the nebulizer medication has been refilled.     Depression/insomnia: she is going to [place], sees Alyssa Avitia. Currently on Cymbalta and trazadone. Her mood has been stable. She is worried about her health. States that the Cymbalta does help with her " pain.     Vitamin D deficiency: Vitamin D level was 21 on 5/18/23.     Stress incontinence: Patient reports intermittent leakage of urine with coughing and valsalva. She does not want to have this further evaluated, however would like to know more about exercises she could do to help.    Current Outpatient Medications on File Prior to Visit   Medication Sig Dispense Refill    albuterol 2.5 mg /3 mL (0.083 %) nebulizer solution Take 3 mL (2.5 mg) by nebulization 4 times a day as needed for wheezing or shortness of breath. 180 mL 3    albuterol 90 mcg/actuation inhaler INHALE 1 (ONE) PUFF BY MOUTH EVERY FOUR HOURS AS NEEDED. 8.5 g 5    DULoxetine (Cymbalta) 30 mg DR capsule Take 1 capsule (30 mg) by mouth once daily. Do not crush or chew.      DULoxetine (Cymbalta) 60 mg DR capsule Take 1 capsule (60 mg) by mouth once daily. Unknown of up dosage      hydroxychloroquine (Plaquenil) 200 mg tablet Take 1 tablet (200 mg) by mouth 2 times a day.      traZODone (Desyrel) 100 mg tablet TAKE 1/2 (ONE-HALF) TO 1 (ONE) TABLET BY MOUTH FOR HEADACHE AS NEEDED for sleep      turmeric root extract 500 mg tablet Take by mouth.      [DISCONTINUED] cholecalciferol (Vitamin D-3) 25 MCG (1000 UT) capsule Take 1 capsule (25 mcg) by mouth once daily. 30 capsule 3    [DISCONTINUED] cyanocobalamin, vitamin B-12, (Vitamin B-12) 1,000 mcg tablet extended release Take 1,000 mcg by mouth once daily. 30 mcg 3    [DISCONTINUED] folic acid (Folvite) 1 mg tablet Take 1 tablet (1 mg) by mouth once daily. 30 tablet 3    [DISCONTINUED] ibuprofen 600 mg tablet Take 1 tablet (600 mg) by mouth.      [DISCONTINUED] Kevzara 200 mg/1.14 mL injection       [DISCONTINUED] semaglutide, weight loss, (Wegovy) 0.25 mg/0.5 mL pen injector Inject 0.25 mg under the skin every 7 days. (Patient not taking: Reported on 12/21/2023) 2 mL 3    [DISCONTINUED] TURMERIC, BULK, MISC Take 1 tablet by mouth once daily.       No current facility-administered medications on file  "prior to visit.        Allergies   Allergen Reactions    Bee Venom Protein (Honey Bee) Anaphylaxis    Fish Containing Products Anaphylaxis    Naproxen Other     CHEST PAIN    Sulfamethoxazole-Trimethoprim Unknown    Watermelon Swelling    Meperidine Hives and Palpitations     TACHYCARDIA WITH RASH    Promethazine Hives and Palpitations         There is no immunization history on file for this patient.      Review of Systems  All pertinent positive symptoms are included in the history of present illness.  All other systems have been reviewed and are negative and noncontributory to this patient's current ailments.     Objective   /85 (BP Location: Right arm, Patient Position: Sitting, BP Cuff Size: Large adult)   Pulse (!) 117   Temp 36.3 °C (97.4 °F)   Resp 16   Ht 1.562 m (5' 1.5\")   Wt 112 kg (247 lb)   SpO2 98%   BMI 45.91 kg/m²   BSA: 2.2 meters squared  No visits with results within 1 Month(s) from this visit.   Latest known visit with results is:   Lab on 10/23/2023   Component Date Value Ref Range Status    Follicle Stimulating Hormone 10/23/2023 21.0  IU/L Final    FSH Ref Values  Follicular   2.0-12.0  IU/L  Mid-Cycle        12.0-25.0  IU/L  Luteal Phase      2.0-12.0  IU/L  Menopause       30.0-150.0  IU/L  Pre-puberty     50% Adult IU/L  Adult Male        2.0-10.0  IU/L   Infants          0.0-1.0  IU/L    Estradiol 10/23/2023 <19  pg/mL Final    Thyroid Stimulating Hormone 10/23/2023 1.39  0.44 - 3.98 mIU/L Final    Progesterone 10/23/2023 <0.3  ng/mL Final    Ref Values  Male              <0.3- 1.2    Follicular Phase  <0.3- 1.4       Luteal Phase       3.3-25.6     Mid-Luteal Phase   4.4-28.0  Postmenopausal    <0.3- 0.7  Pregnant Females:     1st Trimester     11.2- 90.0         2nd Trimester     25.6- 89.4     3RD Trimester     48.4-422.5     Patients receiving DHEA-S supplements may show false elevation of progesterone for results near 1.0 ng/mL. Contact laboratory at [phone] if " alternative testing is needed.         Physical Exam  CONSTITUTIONAL - well nourished, well developed, looks like stated age, in no acute distress, not ill-appearing, and not tired appearing, she is morbidly obese  SKIN - normal skin color and pigmentation, normal skin turgor without rash, lesions, or nodules visualized  HEAD - no trauma, normocephalic  EYES - extraocular muscles are intact, and normal external exam  Neck - supple, normal auricles, no thyroid masses , no neck masses noted  CHEST - clear to auscultation, no wheezing, no crackles and no rales, good effort  CARDIAC - regular rate and regular rhythm, no skipped beats, no murmur, no carotid bruits noted  ABDOMEN - obese, no organomegaly, soft, nontender, nondistended, normal bowel sounds  EXTREMITIES - no edema, no deformities  PSYCHIATRIC - alert, pleasant and cordial, age-appropriate  IMMUNOLOGIC - no cervical lymphadenopathy     Assessment/Plan   Problem List Items Addressed This Visit             ICD-10-CM    Depression, recurrent (CMS/[facility]) F33.9    Obesity, Class III, BMI 40-49.9 (morbid obesity) (CMS/[facility]) - Primary E66.01    Relevant Medications    tirzepatide (Mounjaro) 2.5 mg/0.5 mL pen injector    Other Relevant Orders    Insulin, random    Lipid panel    Comprehensive metabolic panel    CBC and Auto Differential    Vitamin B12 deficiency E53.8    Relevant Medications    folic acid (Folvite) 1 mg tablet    cyanocobalamin, vitamin B-12, (Vitamin B-12) 1,000 mcg tablet extended release    Other Relevant Orders    Vitamin B12    Comprehensive metabolic panel    CBC and Auto Differential    Vitamin D deficiency E55.9    Relevant Medications    cholecalciferol (Vitamin D-3) 25 MCG (1000 UT) capsule    folic acid (Folvite) 1 mg tablet    cyanocobalamin, vitamin B-12, (Vitamin B-12) 1,000 mcg tablet extended release    Other Relevant Orders    Vitamin D 25-Hydroxy,Total (for eval of Vitamin D levels)    Comprehensive metabolic panel    CBC and Auto  Differential    Prediabetes R73.03    Relevant Medications    tirzepatide (Mounjaro) 2.5 mg/0.5 mL pen injector    Other Relevant Orders    Insulin, random    Hemoglobin A1c    Lipid panel    Comprehensive metabolic panel    CBC and Auto Differential    Chronic obstructive pulmonary disease, unspecified COPD type (CMS/[facility]) J44.9    Coagulation defect, unspecified (CMS/[facility]) D68.9       Declines flu vaccine today, check labs as ordered, including insulin level  Trial of Mounjaro for treatment of prediabetes, continue to monitor, hopefully this will encourage weight loss also    Follow up in 3 months

## 2023-12-22 ENCOUNTER — TELEPHONE (OUTPATIENT)
Dept: PRIMARY CARE | Facility: CLINIC | Age: 48
End: 2023-12-22
Payer: MEDICARE

## 2023-12-22 NOTE — TELEPHONE ENCOUNTER
Pt called in and stated you added COPD to pt's problem list yesterday. Pt states that she doesn't have COPD and is concerned about same. Please advise.

## 2023-12-26 ENCOUNTER — TELEPHONE (OUTPATIENT)
Dept: PRIMARY CARE | Facility: CLINIC | Age: 48
End: 2023-12-26
Payer: MEDICARE

## 2023-12-26 NOTE — TELEPHONE ENCOUNTER
Prior authorization request received via fax for MOUNJARO     Form given to: PLACED IN LEAD MA'S BOX ON 12/26/2023

## 2023-12-28 ENCOUNTER — TELEPHONE (OUTPATIENT)
Dept: PRIMARY CARE | Facility: CLINIC | Age: 48
End: 2023-12-28
Payer: MEDICARE

## 2023-12-29 ENCOUNTER — APPOINTMENT (OUTPATIENT)
Dept: RADIOLOGY | Facility: HOSPITAL | Age: 48
End: 2023-12-29
Payer: MEDICARE

## 2024-01-12 ENCOUNTER — HOSPITAL ENCOUNTER (OUTPATIENT)
Dept: RADIOLOGY | Facility: HOSPITAL | Age: 49
Discharge: HOME | End: 2024-01-12
Payer: MEDICARE

## 2024-01-12 ENCOUNTER — LAB (OUTPATIENT)
Dept: LAB | Facility: LAB | Age: 49
End: 2024-01-12
Payer: MEDICARE

## 2024-01-12 DIAGNOSIS — E53.8 VITAMIN B12 DEFICIENCY: ICD-10-CM

## 2024-01-12 DIAGNOSIS — Z12.31 SCREENING MAMMOGRAM FOR BREAST CANCER: ICD-10-CM

## 2024-01-12 DIAGNOSIS — R73.03 PREDIABETES: ICD-10-CM

## 2024-01-12 DIAGNOSIS — E66.01 OBESITY, CLASS III, BMI 40-49.9 (MORBID OBESITY) (MULTI): ICD-10-CM

## 2024-01-12 DIAGNOSIS — E55.9 VITAMIN D DEFICIENCY: ICD-10-CM

## 2024-01-12 LAB
ALBUMIN SERPL BCP-MCNC: 4 G/DL (ref 3.4–5)
ALP SERPL-CCNC: 89 U/L (ref 33–110)
ALT SERPL W P-5'-P-CCNC: 12 U/L (ref 7–45)
ANION GAP SERPL CALC-SCNC: 9 MMOL/L (ref 10–20)
AST SERPL W P-5'-P-CCNC: 14 U/L (ref 9–39)
BASOPHILS # BLD AUTO: 0.04 X10*3/UL (ref 0–0.1)
BASOPHILS NFR BLD AUTO: 0.6 %
BILIRUB SERPL-MCNC: 0.4 MG/DL (ref 0–1.2)
BUN SERPL-MCNC: 16 MG/DL (ref 6–23)
CALCIUM SERPL-MCNC: 9.4 MG/DL (ref 8.6–10.3)
CHLORIDE SERPL-SCNC: 104 MMOL/L (ref 98–107)
CHOLEST SERPL-MCNC: 173 MG/DL (ref 0–199)
CHOLESTEROL/HDL RATIO: 4
CO2 SERPL-SCNC: 29 MMOL/L (ref 21–32)
CREAT SERPL-MCNC: 0.71 MG/DL (ref 0.5–1.05)
EGFRCR SERPLBLD CKD-EPI 2021: >90 ML/MIN/1.73M*2
EOSINOPHIL # BLD AUTO: 0.14 X10*3/UL (ref 0–0.7)
EOSINOPHIL NFR BLD AUTO: 2 %
ERYTHROCYTE [DISTWIDTH] IN BLOOD BY AUTOMATED COUNT: 14.2 % (ref 11.5–14.5)
EST. AVERAGE GLUCOSE BLD GHB EST-MCNC: 126 MG/DL
GLUCOSE SERPL-MCNC: 92 MG/DL (ref 74–99)
HBA1C MFR BLD: 6 %
HCT VFR BLD AUTO: 40.1 % (ref 36–46)
HDLC SERPL-MCNC: 43.1 MG/DL
HGB BLD-MCNC: 12.9 G/DL (ref 12–16)
IMM GRANULOCYTES # BLD AUTO: 0.01 X10*3/UL (ref 0–0.7)
IMM GRANULOCYTES NFR BLD AUTO: 0.1 % (ref 0–0.9)
INSULIN SERPL-ACNC: 15 UIU/ML (ref 3–25)
LDLC SERPL CALC-MCNC: 111 MG/DL
LYMPHOCYTES # BLD AUTO: 2.38 X10*3/UL (ref 1.2–4.8)
LYMPHOCYTES NFR BLD AUTO: 33.2 %
MCH RBC QN AUTO: 28 PG (ref 26–34)
MCHC RBC AUTO-ENTMCNC: 32.2 G/DL (ref 32–36)
MCV RBC AUTO: 87 FL (ref 80–100)
MONOCYTES # BLD AUTO: 0.35 X10*3/UL (ref 0.1–1)
MONOCYTES NFR BLD AUTO: 4.9 %
NEUTROPHILS # BLD AUTO: 4.24 X10*3/UL (ref 1.2–7.7)
NEUTROPHILS NFR BLD AUTO: 59.2 %
NON HDL CHOLESTEROL: 130 MG/DL (ref 0–149)
NRBC BLD-RTO: 0 /100 WBCS (ref 0–0)
PLATELET # BLD AUTO: 309 X10*3/UL (ref 150–450)
POTASSIUM SERPL-SCNC: 4.3 MMOL/L (ref 3.5–5.3)
PROT SERPL-MCNC: 7.3 G/DL (ref 6.4–8.2)
RBC # BLD AUTO: 4.6 X10*6/UL (ref 4–5.2)
SODIUM SERPL-SCNC: 138 MMOL/L (ref 136–145)
TRIGL SERPL-MCNC: 94 MG/DL (ref 0–149)
VIT B12 SERPL-MCNC: 222 PG/ML (ref 211–911)
VLDL: 19 MG/DL (ref 0–40)
WBC # BLD AUTO: 7.2 X10*3/UL (ref 4.4–11.3)

## 2024-01-12 PROCEDURE — 77067 SCR MAMMO BI INCL CAD: CPT | Performed by: RADIOLOGY

## 2024-01-12 PROCEDURE — 83036 HEMOGLOBIN GLYCOSYLATED A1C: CPT

## 2024-01-12 PROCEDURE — 85025 COMPLETE CBC W/AUTO DIFF WBC: CPT

## 2024-01-12 PROCEDURE — 80053 COMPREHEN METABOLIC PANEL: CPT

## 2024-01-12 PROCEDURE — 77063 BREAST TOMOSYNTHESIS BI: CPT | Performed by: RADIOLOGY

## 2024-01-12 PROCEDURE — 83525 ASSAY OF INSULIN: CPT

## 2024-01-12 PROCEDURE — 36415 COLL VENOUS BLD VENIPUNCTURE: CPT

## 2024-01-12 PROCEDURE — 77067 SCR MAMMO BI INCL CAD: CPT

## 2024-01-12 PROCEDURE — 80061 LIPID PANEL: CPT

## 2024-01-12 PROCEDURE — 82607 VITAMIN B-12: CPT

## 2024-02-20 ENCOUNTER — TELEPHONE (OUTPATIENT)
Dept: PRIMARY CARE | Facility: CLINIC | Age: 49
End: 2024-02-20
Payer: MEDICARE

## 2024-02-20 ENCOUNTER — APPOINTMENT (OUTPATIENT)
Dept: RADIOLOGY | Facility: HOSPITAL | Age: 49
End: 2024-02-20
Payer: MEDICARE

## 2024-02-20 ENCOUNTER — HOSPITAL ENCOUNTER (EMERGENCY)
Facility: HOSPITAL | Age: 49
Discharge: HOME | End: 2024-02-20
Payer: MEDICARE

## 2024-02-20 VITALS
WEIGHT: 230 LBS | TEMPERATURE: 98.6 F | BODY MASS INDEX: 42.33 KG/M2 | HEART RATE: 84 BPM | HEIGHT: 62 IN | OXYGEN SATURATION: 100 % | DIASTOLIC BLOOD PRESSURE: 90 MMHG | SYSTOLIC BLOOD PRESSURE: 130 MMHG | RESPIRATION RATE: 18 BRPM

## 2024-02-20 DIAGNOSIS — M79.605 PAIN OF LEFT LOWER EXTREMITY: Primary | ICD-10-CM

## 2024-02-20 PROCEDURE — 93971 EXTREMITY STUDY: CPT

## 2024-02-20 PROCEDURE — 99284 EMERGENCY DEPT VISIT MOD MDM: CPT | Mod: 25 | Performed by: NURSE PRACTITIONER

## 2024-02-20 PROCEDURE — 93971 EXTREMITY STUDY: CPT | Performed by: RADIOLOGY

## 2024-02-20 RX ORDER — KETOROLAC TROMETHAMINE 10 MG/1
10 TABLET, FILM COATED ORAL EVERY 6 HOURS PRN
Qty: 20 TABLET | Refills: 0 | Status: SHIPPED | OUTPATIENT
Start: 2024-02-20 | End: 2024-02-25

## 2024-02-20 ASSESSMENT — PAIN - FUNCTIONAL ASSESSMENT: PAIN_FUNCTIONAL_ASSESSMENT: 0-10

## 2024-02-20 ASSESSMENT — PAIN DESCRIPTION - LOCATION: LOCATION: LEG

## 2024-02-20 ASSESSMENT — COLUMBIA-SUICIDE SEVERITY RATING SCALE - C-SSRS
2. HAVE YOU ACTUALLY HAD ANY THOUGHTS OF KILLING YOURSELF?: NO
1. IN THE PAST MONTH, HAVE YOU WISHED YOU WERE DEAD OR WISHED YOU COULD GO TO SLEEP AND NOT WAKE UP?: NO
6. HAVE YOU EVER DONE ANYTHING, STARTED TO DO ANYTHING, OR PREPARED TO DO ANYTHING TO END YOUR LIFE?: NO

## 2024-02-20 ASSESSMENT — PAIN DESCRIPTION - ORIENTATION: ORIENTATION: LEFT

## 2024-02-20 ASSESSMENT — PAIN SCALES - GENERAL: PAINLEVEL_OUTOF10: 8

## 2024-02-20 NOTE — TELEPHONE ENCOUNTER
Pt called in and stated that she is going to go to the ER because it's swelling really bad and hurts. She just wanted to let you know.

## 2024-02-20 NOTE — TELEPHONE ENCOUNTER
PATIENT CALLED IN STATING SHE FEELS LIKE SHE HAS A KNOT IN HER LEFT LEG. IT IS PAINFUL TO THE TOUCH, IT IS SWOLLEN, NO DISCOLORATION, NO CHANGE IN COLOR, NO CHANGE IN TEMPERATURE ON THE SKIN    PATIENT DOES NOT RECALL ANY INJURY TO THE AREA    PLEASE ADVISE

## 2024-02-20 NOTE — ED PROVIDER NOTES
HPI   Chief Complaint   Patient presents with    left leg pain x couple days; non traumatic; sent by Dr. FLOR Latifpadmini is a 48 year old female with a history of rheumatoid arthritis in bilateral knees. She takes plaquinil and celobrex.     She presents with a 2 day history of LLE pain and swelling. She describes the pain as a tightness in her calf that is worse with knee flexion and palpation of the calf. She also has a tender nodule she described as a muscle knot on the medial anterior aspect of the shin. She recently drove to [place] and back, leaving [place] 4 days ago returning yesterday. She denies any injury to the leg, hormone replacement therapy, history of cancer, SOB, chest pain, recent illness, loss of ROM, fever, chills, nausea, vomiting.      History provided by:  Patient   used: No                        Chaka Coma Scale Score: 15                     Patient History   Past Medical History:   Diagnosis Date    Acute medial meniscal tear 04/26/2023    Adenomatous polyp of colon 04/26/2023    Anemia 04/26/2023    Arthritis or polyarthritis, rheumatoid (CMS/formerly [facility]) 04/26/2023    Asthma 04/26/2023    Bilateral knee pain 04/26/2023    Bruise 04/26/2023    Bursitis of right knee 04/26/2023    Calcaneal spur of left foot 04/26/2023    Chronic diarrhea 04/26/2023    Chronic midline low back pain without sciatica 04/26/2023    Decreased range of motion of right knee 04/26/2023    Depression 04/26/2023    Diverticulosis of colon 04/26/2023    Gait abnormality 04/26/2023    Impaired strength of lower extremity 04/26/2023    Knee injury 04/26/2023    Numbness and tingling 04/26/2023    Obesity, Class III, BMI 40-49.9 (morbid obesity) (CMS/formerly [facility]) 04/26/2023    Patellar tendinitis of right knee 04/26/2023    Polyphagia 04/26/2023    Rheumatoid arthritis involving both knees (CMS/formerly [facility]) 04/26/2023    Right upper quadrant abdominal tenderness 04/26/2023    Sinus congestion 04/26/2023    Tenderness of  left calf 04/26/2023    Vitamin B12 deficiency 04/26/2023    Vitamin D deficiency 04/26/2023    Weakness of both lower extremities 04/26/2023     Past Surgical History:   Procedure Laterality Date    CHOLECYSTECTOMY  09/23/2016    Cholecystectomy    DILATION AND CURETTAGE OF UTERUS  09/23/2016    Dilation And Curettage    OTHER SURGICAL HISTORY  09/23/2016    Ear Surgery    OTHER SURGICAL HISTORY  09/23/2016    Oral Surgery    OTHER SURGICAL HISTORY  06/25/2020    Knee arthroscopy     Family History   Problem Relation Name Age of Onset    Other (pre diabetes) Mother      Hypertension Father      Hyperlipidemia Father      Prostate cancer Father      Diabetes Father's Brother      Colon cancer Father's Brother      Breast cancer Paternal Grandmother      Diabetes Other      Other (pre diabetes) Other      Breast cancer Other      Colon cancer Other      Prostate cancer Other      Hypertension Other      Hyperlipidemia Other       Social History     Tobacco Use    Smoking status: Never    Smokeless tobacco: Never   Vaping Use    Vaping Use: Not on file   Substance Use Topics    Alcohol use: Never    Drug use: Never       Physical Exam   ED Triage Vitals [02/20/24 1119]   Temperature Heart Rate Respirations BP   37 °C (98.6 °F) 84 18 130/90      Pulse Ox Temp src Heart Rate Source Patient Position   100 % -- -- --      BP Location FiO2 (%)     -- --       Physical Exam  Vitals and nursing note reviewed.   Constitutional:       Appearance: She is obese.   HENT:      Head: Normocephalic.      Mouth/Throat:      Mouth: Mucous membranes are moist.   Eyes:      Extraocular Movements: Extraocular movements intact.      Conjunctiva/sclera: Conjunctivae normal.   Cardiovascular:      Rate and Rhythm: Normal rate and regular rhythm.      Pulses: Normal pulses.      Heart sounds: Normal heart sounds.   Pulmonary:      Effort: Pulmonary effort is normal.      Breath sounds: Normal breath sounds.   Abdominal:      Palpations:  Abdomen is soft.   Musculoskeletal:      Cervical back: Normal range of motion.      Left lower leg: Swelling and tenderness present.      Comments: Very mild swelling of the left superior/posterior/medial aspect of the lower leg just inferior to the knee, tenderness of the calf   Skin:     General: Skin is warm and dry.      Capillary Refill: Capillary refill takes less than 2 seconds.   Neurological:      General: No focal deficit present.      Mental Status: She is alert.   Psychiatric:         Mood and Affect: Mood normal.         ED Course & MDM   Diagnoses as of 02/20/24 1437   Pain of left lower extremity       Medical Decision Making  The physician assistant student was personally supervised by me, Nallely Vega CNP, during the physical examination.  I personally performed a physical exam and medical decision making.  I have made appropriate changes to the documentation and assessment and plan based on my verification, exam, and medical decision making.    The patient is presenting to the emergency room with complaints of left lower leg pain.  Differential diagnosis includes musculoskeletal etiology, DVT, varicosities, cellulitic process, or other acute process.  The patient's vital signs are stable.  She was in no acute distress.  An ultrasound of the left lower extremity was obtained and was negative for acute DVT.  She was notified of imaging results.  She was educated on rice therapy.  She is to avoid any activities that will exacerbate her pain.  Patient was provided prescription for ketorolac for pain.  She is to follow-up with her primary care physician for reevaluation if her symptoms do not improve in 5 to 7 days.  She was given strict return precautions.  Patient was discharged in stable condition with computer discharge instructions given.  Patient was agreeable with discharge planning.        Procedure  Procedures     SKINNY Carlos  02/20/24 2098

## 2024-03-20 ENCOUNTER — OFFICE VISIT (OUTPATIENT)
Dept: VASCULAR SURGERY | Facility: CLINIC | Age: 49
End: 2024-03-20
Payer: MEDICARE

## 2024-03-20 VITALS
WEIGHT: 252 LBS | SYSTOLIC BLOOD PRESSURE: 114 MMHG | BODY MASS INDEX: 46.84 KG/M2 | DIASTOLIC BLOOD PRESSURE: 78 MMHG | HEART RATE: 86 BPM

## 2024-03-20 DIAGNOSIS — J45.909 UNSPECIFIED ASTHMA, UNCOMPLICATED (HHS-HCC): ICD-10-CM

## 2024-03-20 DIAGNOSIS — M79.89 LEG SWELLING: Primary | ICD-10-CM

## 2024-03-20 PROCEDURE — 99202 OFFICE O/P NEW SF 15 MIN: CPT | Performed by: INTERNAL MEDICINE

## 2024-03-20 PROCEDURE — 3008F BODY MASS INDEX DOCD: CPT | Performed by: INTERNAL MEDICINE

## 2024-03-20 PROCEDURE — 1036F TOBACCO NON-USER: CPT | Performed by: INTERNAL MEDICINE

## 2024-03-20 RX ORDER — ALBUTEROL SULFATE 0.83 MG/ML
2.5 SOLUTION RESPIRATORY (INHALATION) 4 TIMES DAILY PRN
Qty: 180 ML | Refills: 3 | Status: SHIPPED | OUTPATIENT
Start: 2024-03-20 | End: 2025-03-20

## 2024-03-20 ASSESSMENT — ENCOUNTER SYMPTOMS
ALLERGIC/IMMUNOLOGIC NEGATIVE: 1
HEMATOLOGIC/LYMPHATIC NEGATIVE: 1
RESPIRATORY NEGATIVE: 1
ENDOCRINE NEGATIVE: 1
MUSCULOSKELETAL NEGATIVE: 1
NEUROLOGICAL NEGATIVE: 1
EYES NEGATIVE: 1
GASTROINTESTINAL NEGATIVE: 1
PSYCHIATRIC NEGATIVE: 1
CONSTITUTIONAL NEGATIVE: 1

## 2024-03-20 NOTE — PROGRESS NOTES
Subjective   Patient ID: Alexander Marin is a 48 y.o. female who presents for No chief complaint on file..  HPI  48 year old female with a past medical history of RA, obesity and asthma that presents to the office for initial consult for varicose veins.  She complains of leg heaviness, achiness, swelling that is worse at the end of the day. She has a varicosity on her left lower leg and some spider veins on her right leg. No open sores or wounds noted. She denies any previous history of a DVT. Denies smoking. Denies leg claudication.   Has a history of RA in the knees and bakers cyst.     She went to the ED on 2/20/24 for complaints of LLE pain/swelling. She had an ultrasound that was negative for DVT. She was given Ketoralac for pain.    Vascular testing:  Left leg duplex 2/20/24: No sonographic evidence for deep vein thrombosis within the evaluated  veins of the left lower extremity.    Review of Systems   Constitutional: Negative.    HENT: Negative.     Eyes: Negative.    Respiratory: Negative.     Cardiovascular:  Positive for leg swelling.   Gastrointestinal: Negative.    Endocrine: Negative.    Genitourinary: Negative.    Musculoskeletal: Negative.    Skin: Negative.    Allergic/Immunologic: Negative.    Neurological: Negative.    Hematological: Negative.    Psychiatric/Behavioral: Negative.         Objective   Physical Exam  Eyes:      Pupils: Pupils are equal, round, and reactive to light.   Cardiovascular:      Rate and Rhythm: Normal rate.   Pulmonary:      Effort: Pulmonary effort is normal.   Abdominal:      General: Bowel sounds are normal.   Musculoskeletal:         General: Swelling present.      Cervical back: Normal range of motion.   Skin:     General: Skin is warm and dry.      Capillary Refill: Capillary refill takes less than 2 seconds.   Neurological:      General: No focal deficit present.      Mental Status: She is alert.   Psychiatric:         Mood and Affect: Mood normal.          Assessment/Plan   48 year old female with a past medical history of RA, obesity and asthma that presents to the office for initial consult for varicose veins    Plan:  Had a negative duplex on LLE on 2/20/24  Would recommend a venous insufficiency ultrasound  Follow up with Dr. Main after testing  Recommend compression stockings 20-30 mmhg, rest and elevation   Dicussed weight loss           JOVANNY Magana-CNP 03/20/24 9:46 AM

## 2024-04-03 ENCOUNTER — OFFICE VISIT (OUTPATIENT)
Dept: PRIMARY CARE | Facility: CLINIC | Age: 49
End: 2024-04-03
Payer: MEDICARE

## 2024-04-03 VITALS
WEIGHT: 251.9 LBS | SYSTOLIC BLOOD PRESSURE: 113 MMHG | HEIGHT: 62 IN | HEART RATE: 109 BPM | BODY MASS INDEX: 46.35 KG/M2 | RESPIRATION RATE: 16 BRPM | TEMPERATURE: 97.7 F | OXYGEN SATURATION: 97 % | DIASTOLIC BLOOD PRESSURE: 69 MMHG

## 2024-04-03 DIAGNOSIS — F33.9 DEPRESSION, RECURRENT (CMS-HCC): ICD-10-CM

## 2024-04-03 DIAGNOSIS — D68.9 COAGULATION DEFECT, UNSPECIFIED (MULTI): ICD-10-CM

## 2024-04-03 DIAGNOSIS — E66.01 OBESITY, CLASS III, BMI 40-49.9 (MORBID OBESITY) (MULTI): ICD-10-CM

## 2024-04-03 DIAGNOSIS — R73.03 PREDIABETES: ICD-10-CM

## 2024-04-03 DIAGNOSIS — Z79.899 MEDICATION MANAGEMENT: ICD-10-CM

## 2024-04-03 DIAGNOSIS — E78.00 ELEVATED LOW DENSITY LIPOPROTEIN (LDL) CHOLESTEROL LEVEL: ICD-10-CM

## 2024-04-03 DIAGNOSIS — E74.89 OTHER SPECIFIED DISORDERS OF CARBOHYDRATE METABOLISM (MULTI): ICD-10-CM

## 2024-04-03 DIAGNOSIS — M81.0 POSTMENOPAUSAL BONE LOSS: ICD-10-CM

## 2024-04-03 DIAGNOSIS — Z00.00 ROUTINE GENERAL MEDICAL EXAMINATION AT HEALTH CARE FACILITY: ICD-10-CM

## 2024-04-03 DIAGNOSIS — J44.9 CHRONIC OBSTRUCTIVE PULMONARY DISEASE, UNSPECIFIED COPD TYPE (MULTI): ICD-10-CM

## 2024-04-03 DIAGNOSIS — M06.9 RHEUMATOID ARTHRITIS, INVOLVING UNSPECIFIED SITE, UNSPECIFIED WHETHER RHEUMATOID FACTOR PRESENT (MULTI): Primary | ICD-10-CM

## 2024-04-03 PROBLEM — M77.30 CALCANEAL SPUR: Status: ACTIVE | Noted: 2024-04-03

## 2024-04-03 PROBLEM — Z86.39 HISTORY OF OBESITY: Status: ACTIVE | Noted: 2023-06-21

## 2024-04-03 PROBLEM — L98.9 SKIN LESION: Status: ACTIVE | Noted: 2024-04-03

## 2024-04-03 PROBLEM — M25.473 ANKLE SWELLING: Status: ACTIVE | Noted: 2024-04-03

## 2024-04-03 PROBLEM — M25.569 KNEE PAIN: Status: ACTIVE | Noted: 2022-08-17

## 2024-04-03 PROBLEM — M17.11 OSTEOARTHRITIS OF RIGHT KNEE: Status: ACTIVE | Noted: 2018-04-24

## 2024-04-03 PROCEDURE — G0439 PPPS, SUBSEQ VISIT: HCPCS | Performed by: INTERNAL MEDICINE

## 2024-04-03 PROCEDURE — 99214 OFFICE O/P EST MOD 30 MIN: CPT | Performed by: INTERNAL MEDICINE

## 2024-04-03 PROCEDURE — 1036F TOBACCO NON-USER: CPT | Performed by: INTERNAL MEDICINE

## 2024-04-03 PROCEDURE — 3008F BODY MASS INDEX DOCD: CPT | Performed by: INTERNAL MEDICINE

## 2024-04-03 SDOH — ECONOMIC STABILITY: FOOD INSECURITY: WITHIN THE PAST 12 MONTHS, THE FOOD YOU BOUGHT JUST DIDN'T LAST AND YOU DIDN'T HAVE MONEY TO GET MORE.: NEVER TRUE

## 2024-04-03 SDOH — ECONOMIC STABILITY: FOOD INSECURITY: WITHIN THE PAST 12 MONTHS, YOU WORRIED THAT YOUR FOOD WOULD RUN OUT BEFORE YOU GOT MONEY TO BUY MORE.: NEVER TRUE

## 2024-04-03 ASSESSMENT — ACTIVITIES OF DAILY LIVING (ADL)
BATHING: INDEPENDENT
GROCERY_SHOPPING: INDEPENDENT
TAKING_MEDICATION: INDEPENDENT
DRESSING: INDEPENDENT
MANAGING_FINANCES: INDEPENDENT
DOING_HOUSEWORK: INDEPENDENT

## 2024-04-03 ASSESSMENT — PATIENT HEALTH QUESTIONNAIRE - PHQ9
SUM OF ALL RESPONSES TO PHQ9 QUESTIONS 1 & 2: 0
1. LITTLE INTEREST OR PLEASURE IN DOING THINGS: NOT AT ALL
2. FEELING DOWN, DEPRESSED OR HOPELESS: NOT AT ALL

## 2024-04-03 ASSESSMENT — LIFESTYLE VARIABLES
HOW OFTEN DO YOU HAVE A DRINK CONTAINING ALCOHOL: NEVER
HOW MANY STANDARD DRINKS CONTAINING ALCOHOL DO YOU HAVE ON A TYPICAL DAY: PATIENT DOES NOT DRINK
HOW OFTEN DO YOU HAVE SIX OR MORE DRINKS ON ONE OCCASION: NEVER
AUDIT-C TOTAL SCORE: 0
SKIP TO QUESTIONS 9-10: 1

## 2024-04-03 ASSESSMENT — ENCOUNTER SYMPTOMS
OCCASIONAL FEELINGS OF UNSTEADINESS: 0
DEPRESSION: 0
LOSS OF SENSATION IN FEET: 0

## 2024-04-03 NOTE — ASSESSMENT & PLAN NOTE
Check labs, will refer to Clinical Pharmacy for evaluation to see if any meds are covered for treatment of obesity (GLP 1 agonists)

## 2024-04-03 NOTE — PROGRESS NOTES
"      Serge Marie MD Subjective   Reason for Visit: Alexander Marin is an 48 y.o. female here for a Medicare Wellness visit.     Past Medical, Surgical, and Family History reviewed and updated in chart.    Reviewed all medications by prescribing practitioner or clinical pharmacist (such as prescriptions, OTCs, herbal therapies and supplements) and documented in the medical record.    HPI  Follow up and Medicare wellness visit:    right knee pain: Following with Dr. Harmon. Has had multiple injections. Plan to do arthroplasty after patient's BMI <40. However, they are hesitant due to patient's age. Taking Tylenol for pain. She has seen pain management. Has been through PT, which did help. She does home exercises that she learned at PT.       Obesity/weight loss: has been trying to lose weight. She states that this is the most she has ever weighed. Patient was given a prescription for Wegovy, however it was \"on back order\" and she has not received it. She does follow with a nutritionist, follow up in January. The patient does walk for 30 minutes daily, though swimming seems to do better for her knees. No longer drinking sodas.  Patient is on food for life program.   Patient has not been able to get Mounjaro. She has been trying to lose weight.     Prediabetes: A1c was 6.0 in 1/2024  Denies any polyuria, polydipsia, polyphagia, vision changes or neuropathy      RA/Sarcoidosis arthritis: patient is taking Plaquenil per Dr Lundy now, is taking folate also.  Recently saw Dr Lundy for follow up. Patient has swelling of the RLE, patient has US scheduled of the  s     Asthma: Currently uses her albuterol inhaler as needed. She is inquiring if the nebulizer medication has been refilled.     Depression/insomnia: she is going to [facility], sees Alyssa Avitia. Currently on Cymbalta and trazadone. Her mood has been stable. She is worried about her health. States that the Cymbalta does help with her pain.    " "  Vitamin D deficiency: patient takes vitamin d     Stress incontinence: Patient reports intermittent leakage of urine with coughing and valsalva. She does not want to have this further evaluated.     Patient Care Team:  Serge Marie MD as PCP - General (Internal Medicine)  Serge Marie MD as PCP - [employer] PCP  Serge Marie MD as PCP - [facility] PCP     Review of Systems    All pertinent positive symptoms are included in the history of present illness.     Objective   Vitals:  /69 (BP Location: Right arm, Patient Position: Sitting, BP Cuff Size: Large adult)   Pulse 109   Temp 36.5 °C (97.7 °F)   Resp 16   Ht 1.562 m (5' 1.5\")   Wt 114 kg (251 lb 14.4 oz)   SpO2 97%   BMI 46.83 kg/m²       Physical Exam    CONSTITUTIONAL - well nourished, well developed, looks like stated age, in no acute distress, not ill-appearing, and not tired appearing, she is morbidly obese  SKIN - normal skin color and pigmentation, normal skin turgor without rash, lesions, or nodules visualized on exposed skin  HEAD - no trauma, normocephalic  EYES - extraocular muscles are intact, and normal external exam  Neck - supple, normal auricles, no thyroid masses , no neck masses noted  CHEST - clear to auscultation, no wheezing, no crackles and no rales, good effort  CARDIAC - regular rate and regular rhythm, no skipped beats, no murmur, no carotid bruits noted, HR is approximately 80 now  ABDOMEN - obese, no organomegaly, soft, nontender, nondistended, normal bowel sounds  EXTREMITIES - no edema, no deformities  PSYCHIATRIC - alert, pleasant and cordial, age-appropriate  IMMUNOLOGIC - no cervical lymphadenopathy     Assessment/Plan   Problem List Items Addressed This Visit       Arthritis or polyarthritis, rheumatoid (CMS/HCC) - Primary    Current Assessment & Plan     Sees rheumatology for treatment, takes Plaquenil          Relevant Orders    CBC and Auto Differential    Vitamin D " 25-Hydroxy,Total (for eval of Vitamin D levels)    Depression, recurrent (CMS/[facility])    Current Assessment & Plan     Continue current meds per [facility] (Alyssa Avitia)         Relevant Orders    CBC and Auto Differential    Obesity, Class III, BMI 40-49.9 (morbid obesity) (CMS/[facility])    Current Assessment & Plan     Continue Food for Life, will refer to clinical pharmacy also         Relevant Orders    CBC and Auto Differential    Vitamin D 25-Hydroxy,Total (for eval of Vitamin D levels)    Routine general medical examination at health care facility    Current Assessment & Plan     Recommend TDAP, patient will defer today, will screen for HIV and hepatitis c         Relevant Orders    CBC and Auto Differential    HIV 1/2 Antigen/Antibody Screen with Reflex to Confirmation    Hepatitis C antibody    Prediabetes    Current Assessment & Plan     Check labs, will refer to Clinical Pharmacy for evaluation to see if any meds are covered for treatment of obesity (GLP 1 agonists)         Relevant Orders    Albumin , Urine Random    CBC and Auto Differential    Hemoglobin A1C    Lipid Panel    Coagulation defect, unspecified (CMS/[facility])    Relevant Orders    CBC and Auto Differential    Chronic obstructive pulmonary disease (CMS/[facility])    Relevant Orders    CBC and Auto Differential    Other specified disorders of carbohydrate metabolism (CMS/[facility])    Relevant Orders    CBC and Auto Differential    Lipid Panel     Other Visit Diagnoses       Postmenopausal bone loss        Relevant Orders    Vitamin D 25-Hydroxy,Total (for eval of Vitamin D levels)    Elevated low density lipoprotein (LDL) cholesterol level        Relevant Orders    Lipid Panel    Medication management        Relevant Orders    Referral to Clinical Pharmacy          Medicare wellness exam completed, Clinical pharmacy evaluation ordered

## 2024-04-10 ENCOUNTER — HOSPITAL ENCOUNTER (OUTPATIENT)
Dept: VASCULAR MEDICINE | Facility: HOSPITAL | Age: 49
Discharge: HOME | End: 2024-04-10
Payer: MEDICARE

## 2024-04-10 DIAGNOSIS — M79.89 LEG SWELLING: ICD-10-CM

## 2024-04-10 PROCEDURE — 93970 EXTREMITY STUDY: CPT

## 2024-04-10 PROCEDURE — 93970 EXTREMITY STUDY: CPT | Performed by: INTERNAL MEDICINE

## 2024-04-17 ENCOUNTER — TELEPHONE (OUTPATIENT)
Dept: VASCULAR SURGERY | Facility: CLINIC | Age: 49
End: 2024-04-17
Payer: MEDICARE

## 2024-04-17 NOTE — TELEPHONE ENCOUNTER
Result Communication    Resulted Orders   Vascular US lower extremity venous insufficiency bilateral    [facility]  [address] [id number]       Phone [phone] Fax [phone]       Vascular Lab Report     [facility]    Patient Name:      CHRISTINE LIM        Kelsey Physician: [id number] Ericka OCONNELL MD  Study Date:        4/10/2024          Ordering Provider: [id number] AALIYAH RAY  MRN/PID:           [id number]           Fellow:  Accession#:        [id number]       Technologist:      Palak Caceres RVSULEIMAN  Date of Birth/Age: 1975 / 48      Technologist 2:                     years  Gender:            F                  Encounter#:        [id number]  Admission Status:  Outpatient         Location           [facility]                                        Performed:       Diagnosis/ICD:    Other specified soft tissue disorders-M79.89  CPT Codes:        33700 Venous reflux study VV VI complete  Patient Position: Study performed in a reverse Trendelenburg position.       CONCLUSIONS:  Right Lower Venous Insufficiency: There is reflux noted in the common femoral vein. There is no evidence of venous insufficiency involving the superficial venous system in the right lower extremity. The right small saphenous vein arises proximal to the sapheno-popliteal junction.  Left Lower Venous Insufficiency: Reflux is noted in the saphenofemoral junction, proximal thigh great saphenous, mid thigh great saphenous, knee level of great saphenous and proximal calf great saphenous veins. There is reflux noted in the common femoral vein. Left small saphenous vein arises proximal to the sapheno-popliteal junction.  Right Lower Venous: No evidence of acute deep vein thrombus visualized in the right lower extremity. Calf veins visualized in segments.  Left Lower  Venous: No evidence of acute deep vein thrombus visualized in the left lower extremity.     Imaging & Doppler Findings:     Right          Compress Thrombus   Time  SFJ              Yes      None  Prox Thigh GSV   Yes      None  Mid Thigh GSV    Yes      None  Knee GSV         Yes      None  Prox Calf GSV    Yes      None  Mid Calf GSV     Yes      None  Dist Calf GSV    Yes      None  SPJ              Yes      None  SSV Prox         Yes      None  SSV Mid          Yes      None  SSV Distal       Yes      None  Common FV                        1.08 sec       Left           Compress Thrombus  Diam   Depth    Time  SFJ              Yes      None   7.9 mm 19.9 mm 1.01 sec  Prox Thigh GSV   Yes      None   5.6 mm 22.5 mm 1.00 sec  Mid Thigh GSV    Yes      None   4.9 mm 20.6 mm 1.30 sec  Knee GSV         Yes      None   4.4 mm 30.4 mm 2.50 sec  Prox Calf GSV    Yes      None   2.5 mm 14.4 mm 1.10 sec  Mid Calf GSV     Yes      None  Dist Calf GSV    Yes      None  SPJ              Yes      None  SSV Prox         Yes      None  SSV Mid          Yes      None  SSV Distal       Yes      None       Right                 Compressible Thrombus        Flow  Distal External Iliac                       Spontaneous/Phasic  CFV                       Yes        None         Reflux  PFV                       Yes        None  FV Proximal               Yes        None   Spontaneous/Phasic  FV Mid                    Yes        None  FV Distal                 Yes        None  Popliteal                 Yes        None   Spontaneous/Phasic  Peroneal                  Yes        None  PTV                       Yes        None       Left                  Compress Thrombus        Flow  Distal External Iliac                   Spontaneous/Phasic  CFV                     Yes      None         Reflux  PFV                     Yes      None  FV Proximal             Yes      None   Spontaneous/Phasic  FV Mid                  Yes      None  FV Distal                Yes      None  Popliteal               Yes      None   Spontaneous/Phasic  Peroneal                Yes      None  PTV                     Yes      None       [id number] Ericka Alvarado MD  Electronically signed by [id number] Ericka Alvarado MD on 4/12/2024 at 11:14:33 AM         ** Final **         9:08 AM      Results were not successfully communicated with the patient and they did not acknowledge their understanding.    Pt has an upcoming appt with Dr. Main to discuss results. No evidence of DVT. Does show deep venous reflux

## 2024-04-23 ENCOUNTER — OFFICE VISIT (OUTPATIENT)
Dept: VASCULAR SURGERY | Facility: CLINIC | Age: 49
End: 2024-04-23
Payer: MEDICARE

## 2024-04-23 VITALS
HEART RATE: 80 BPM | SYSTOLIC BLOOD PRESSURE: 139 MMHG | DIASTOLIC BLOOD PRESSURE: 92 MMHG | BODY MASS INDEX: 46.84 KG/M2 | WEIGHT: 252 LBS

## 2024-04-23 DIAGNOSIS — I83.812 VARICOSE VEINS OF LEFT LOWER EXTREMITY WITH PAIN: Primary | ICD-10-CM

## 2024-04-23 PROCEDURE — 1036F TOBACCO NON-USER: CPT | Performed by: SURGERY

## 2024-04-23 PROCEDURE — 99213 OFFICE O/P EST LOW 20 MIN: CPT | Performed by: SURGERY

## 2024-04-23 PROCEDURE — 3008F BODY MASS INDEX DOCD: CPT | Performed by: SURGERY

## 2024-04-23 NOTE — PROGRESS NOTES
Subjective   Patient ID: Alexander Marin is a 48 y.o. female who presents for Follow-up (Results ).  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  Does not claudicate  Review of Systems    Objective   Physical Exam  Physical exam    Constitutional: alert and in no acute distress verbal  Eyes: No erythema swelling or discharge noted  Neck: supple, symmetric, trachea midline, no masses noted  Cardiovascular: Carotid pulses 2+, no obvious bruit, no Jugular distension noted, no thrill, heart regular rate, lower extremity vascular exam intact, cap refill <2 sec  Pulmonary:  Bilateral breath sounds intact, clear with rales rhonchi or wheeze  Abdomen: soft non tender, no pulsatile masses noted, no rebound rigidity or guarding noted  Skin: intact warm no abnormal turgor  Psychiatric: alert without any obvious cognitive issues, oriented to person, place, and time  Varicose veins left upper medial calf    Assessment/Plan   Patient has GSV reflux left lower extremity with associated varicose veins  These are symptomatic  Compression does help improve symptoms  Response complication endovenous ablation explained possible infection bleed deep vein thrombosis  All questions were addressed patient does understand wishes to proceed.         Ander Main DO 04/23/24 9:53 AM

## 2024-04-26 ENCOUNTER — TELEMEDICINE (OUTPATIENT)
Dept: PHARMACY | Facility: HOSPITAL | Age: 49
End: 2024-04-26
Payer: MEDICARE

## 2024-04-26 DIAGNOSIS — Z79.899 MEDICATION MANAGEMENT: ICD-10-CM

## 2024-04-26 NOTE — PROGRESS NOTES
Pharmacist Clinic: Weight Management    Alexander Marin was referred to the Clinical Pharmacy Team for weight management.     Referring Provider: Serge Marie, *  - Last visit with referring provider: 4/3/24    HISTORY OF PRESENT ILLNESS    - Patient with baseline BMI = 46.84 referred to assist with initiation of GLP-1 agonist therapy for weight loss    Diet:   - 3 meals/day  - Trying to improve diet  - Breakfast - oatmeal with fruit  - Lunch - salad  - Dinner - meat, carb, veggie  - Snacks - none  - Drinks - water, tea    Exercise Routine:   - Previously was swimming but hasn't been able to  - Tries to do 30 minute exercises at home  - Limited by arthritis     Weight loss:   - Baseline weight: 252 lbs    Adverse effects: none    PHARMACY ASSESSMENT    CURRENT WEIGHT LOSS PHARMACOTHERAPY  - None     AFFORDABILITY/ACCESSIBILITY  - No GLP-1 agonists covered on insurance  - Unable to use coupons d/t Medicare insurance    DRUG INTERACTIONS  - No significant drug-drug interactions exist that require adjustment to therapy    Pertinent PMH Review:  - PMH of Pancreatitis: No  - PMH of Retinopathy: No  - PMH of MTC: No    Allergies: Bee venom protein (honey bee), Fish containing products, Naproxen, Sulfamethoxazole-trimethoprim, Watermelon, Meperidine, and Promethazine     [email or web address] [address] [place] [address]  Phone: [phone] Fax: [phone]    [facility] (Now [facility]) - [facility] [address] [facility]  [address] [facility] [address]  Phone: [phone] Fax: [phone]      LAB  Lab Results   Component Value Date    BILITOT 0.4 01/12/2024    CALCIUM 9.4 01/12/2024    CO2 29 01/12/2024     01/12/2024    CREATININE 0.71 01/12/2024    GLUCOSE 92 01/12/2024    ALKPHOS 89 01/12/2024    K 4.3 01/12/2024    PROT 7.3 01/12/2024     01/12/2024    AST 14 01/12/2024    ALT 12 01/12/2024    BUN 16  01/12/2024    ANIONGAP 9 (L) 01/12/2024    ALBUMIN 4.0 01/12/2024    LIPASE 18 02/20/2021    EGFR >90 01/12/2024     Lab Results   Component Value Date    TRIG 94 01/12/2024    CHOL 173 01/12/2024    LDLCALC 111 (H) 01/12/2024    HDL 43.1 01/12/2024     Lab Results   Component Value Date    HGBA1C 6.0 (H) 01/12/2024       Current Outpatient Medications on File Prior to Visit   Medication Sig Dispense Refill    albuterol 2.5 mg /3 mL (0.083 %) nebulizer solution Take 3 mL (2.5 mg) by nebulization 4 times a day as needed for wheezing or shortness of breath. 180 mL 3    albuterol 90 mcg/actuation inhaler INHALE 1 (ONE) PUFF BY MOUTH EVERY FOUR HOURS AS NEEDED. 8.5 g 5    cholecalciferol (Vitamin D-3) 25 MCG (1000 UT) capsule Take 1 capsule (25 mcg) by mouth once daily. 90 capsule 1    cyanocobalamin, vitamin B-12, (Vitamin B-12) 1,000 mcg tablet extended release Take 1 tablet (1,000 mcg) by mouth once daily. 90 tablet 1    DULoxetine (Cymbalta) 30 mg DR capsule Take 1 capsule (30 mg) by mouth once daily. Do not crush or chew.      DULoxetine (Cymbalta) 60 mg DR capsule Take 1 capsule (60 mg) by mouth once daily. Unknown of up dosage      folic acid (Folvite) 1 mg tablet Take 1 tablet (1 mg) by mouth once daily. 90 tablet 1    hydroxychloroquine (Plaquenil) 200 mg tablet Take 1 tablet (200 mg) by mouth 2 times a day.      traZODone (Desyrel) 100 mg tablet TAKE 1/2 (ONE-HALF) TO 1 (ONE) TABLET BY MOUTH FOR HEADACHE AS NEEDED for sleep      turmeric root extract 500 mg tablet Take by mouth.       No current facility-administered medications on file prior to visit.       PATIENT EDUCATION/GOALS  - Target goal 5% to 10% weight loss within 3-6 months  - Counseled patient on MOA, expectations, side effects, duration of therapy, contraindications, administration, and monitoring parameters  - Answered all patient questions and concerns; provided [facility] phone number if issues/questions arise    RECOMMENDATIONS/PLAN  Problem List  Items Addressed This Visit    None  Visit Diagnoses       Medication management              ASSESSMENT:  Patient with baseline BMI = 46.84 interested in GLP-1 agonist therapy for weight loss.  Rationale for plan: Upon insurance coverage assessment, weight loss medications are a plan exclusion and all DM GLP-1 agonists require a DM diagnosis.    PLAN:  Continue working on diet and exercise.  Clinical Pharmacist follow up: PRN  PCP follow up: 10/3/24    Continue all meds under the continuation of care with the referring provider and clinical pharmacy team.    Thank you,  Silvia Ham, PharmD  Clinical Pharmacy Specialist  [phone]  [email or web address]     Verbal consent to manage patient's drug therapy was obtained from patient. They were informed they may decline to participate or withdraw from participation in pharmacy services at any time.

## 2024-05-08 ENCOUNTER — TELEPHONE (OUTPATIENT)
Dept: VASCULAR SURGERY | Facility: CLINIC | Age: 49
End: 2024-05-08
Payer: MEDICARE

## 2024-05-08 DIAGNOSIS — M79.89 LEG SWELLING: Primary | ICD-10-CM

## 2024-05-08 NOTE — TELEPHONE ENCOUNTER
----- Message from Ander Main DO sent at 4/23/2024  3:12 PM EDT -----  Regarding: RE: LT GSV RFA (6/28/24)  Opened    ----- Message -----  From: Giana Fernández LPN  Sent: 4/23/2024   2:45 PM EDT  To: Ander Main DO; Giana Fernández LPN  Subject: LT GSV RFA (6/28/24)                             PLEASE START CASE FOR 6/28/24    NO CLEARANCE NEEDED   PATIENT HAS PRE OP PAPERWORK  FUV IS SCHEDULED

## 2024-05-28 DIAGNOSIS — J45.909 UNSPECIFIED ASTHMA, UNCOMPLICATED (HHS-HCC): ICD-10-CM

## 2024-05-29 RX ORDER — ALBUTEROL SULFATE 90 UG/1
AEROSOL, METERED RESPIRATORY (INHALATION)
Qty: 8.5 G | Refills: 5 | Status: SHIPPED | OUTPATIENT
Start: 2024-05-29

## 2024-06-09 ENCOUNTER — ANESTHESIA EVENT (OUTPATIENT)
Dept: OPERATING ROOM | Facility: HOSPITAL | Age: 49
End: 2024-06-09
Payer: MEDICARE

## 2024-06-18 ENCOUNTER — PRE-ADMISSION TESTING (OUTPATIENT)
Dept: PREADMISSION TESTING | Facility: HOSPITAL | Age: 49
End: 2024-06-18
Payer: MEDICARE

## 2024-06-18 DIAGNOSIS — Z01.818 PRE-OP EVALUATION: Primary | ICD-10-CM

## 2024-06-18 DIAGNOSIS — E66.01 MORBID OBESITY (MULTI): ICD-10-CM

## 2024-06-18 DIAGNOSIS — J44.9 CHRONIC OBSTRUCTIVE PULMONARY DISEASE, UNSPECIFIED COPD TYPE (MULTI): ICD-10-CM

## 2024-06-18 DIAGNOSIS — D68.9 COAGULATION DEFECT, UNSPECIFIED (MULTI): ICD-10-CM

## 2024-06-18 DIAGNOSIS — M79.89 LEG SWELLING: ICD-10-CM

## 2024-06-20 ENCOUNTER — APPOINTMENT (OUTPATIENT)
Dept: PREADMISSION TESTING | Facility: HOSPITAL | Age: 49
End: 2024-06-20
Payer: MEDICARE

## 2024-06-24 ENCOUNTER — PRE-ADMISSION TESTING (OUTPATIENT)
Dept: PREADMISSION TESTING | Facility: HOSPITAL | Age: 49
End: 2024-06-24
Payer: MEDICARE

## 2024-06-24 VITALS
RESPIRATION RATE: 20 BRPM | HEART RATE: 94 BPM | DIASTOLIC BLOOD PRESSURE: 92 MMHG | SYSTOLIC BLOOD PRESSURE: 132 MMHG | BODY MASS INDEX: 47.39 KG/M2 | OXYGEN SATURATION: 95 % | HEIGHT: 61 IN | WEIGHT: 251 LBS | TEMPERATURE: 97.9 F

## 2024-06-24 DIAGNOSIS — M25.473 ANKLE SWELLING, UNSPECIFIED LATERALITY: ICD-10-CM

## 2024-06-24 DIAGNOSIS — E66.01 OBESITY, CLASS III, BMI 40-49.9 (MORBID OBESITY) (MULTI): ICD-10-CM

## 2024-06-24 DIAGNOSIS — I83.892 VARICOSE VEINS OF LEFT LEG WITH EDEMA: ICD-10-CM

## 2024-06-24 DIAGNOSIS — D64.9 ANEMIA, UNSPECIFIED TYPE: ICD-10-CM

## 2024-06-24 DIAGNOSIS — J45.20 INTERMITTENT ASTHMA, UNSPECIFIED ASTHMA SEVERITY, UNSPECIFIED WHETHER COMPLICATED (HHS-HCC): ICD-10-CM

## 2024-06-24 DIAGNOSIS — J44.9 CHRONIC OBSTRUCTIVE PULMONARY DISEASE, UNSPECIFIED COPD TYPE (MULTI): ICD-10-CM

## 2024-06-24 DIAGNOSIS — Z01.818 PRE-OP EVALUATION: Primary | ICD-10-CM

## 2024-06-24 LAB
ANION GAP SERPL CALC-SCNC: 11 MMOL/L (ref 10–20)
ATRIAL RATE: 84 BPM
BUN SERPL-MCNC: 15 MG/DL (ref 6–23)
CALCIUM SERPL-MCNC: 8.7 MG/DL (ref 8.6–10.3)
CHLORIDE SERPL-SCNC: 107 MMOL/L (ref 98–107)
CO2 SERPL-SCNC: 25 MMOL/L (ref 21–32)
CREAT SERPL-MCNC: 0.73 MG/DL (ref 0.5–1.05)
EGFRCR SERPLBLD CKD-EPI 2021: >90 ML/MIN/1.73M*2
ERYTHROCYTE [DISTWIDTH] IN BLOOD BY AUTOMATED COUNT: 14.4 % (ref 11.5–14.5)
GLUCOSE SERPL-MCNC: 106 MG/DL (ref 74–99)
HCT VFR BLD AUTO: 36.5 % (ref 36–46)
HGB BLD-MCNC: 12 G/DL (ref 12–16)
MCH RBC QN AUTO: 28.2 PG (ref 26–34)
MCHC RBC AUTO-ENTMCNC: 32.9 G/DL (ref 32–36)
MCV RBC AUTO: 86 FL (ref 80–100)
NRBC BLD-RTO: 0 /100 WBCS (ref 0–0)
P AXIS: 1 DEGREES
PLATELET # BLD AUTO: 290 X10*3/UL (ref 150–450)
POTASSIUM SERPL-SCNC: 3.6 MMOL/L (ref 3.5–5.3)
PR INTERVAL: 182 MS
Q ONSET: 249 MS
QRS COUNT: 14 BEATS
QRS DURATION: 94 MS
QT INTERVAL: 392 MS
QTC CALCULATION(BAZETT): 464 MS
QTC FREDERICIA: 438 MS
R AXIS: 21 DEGREES
RBC # BLD AUTO: 4.26 X10*6/UL (ref 4–5.2)
SODIUM SERPL-SCNC: 139 MMOL/L (ref 136–145)
T AXIS: 17 DEGREES
T OFFSET: 445 MS
VENTRICULAR RATE: 84 BPM
WBC # BLD AUTO: 7.8 X10*3/UL (ref 4.4–11.3)

## 2024-06-24 PROCEDURE — 36415 COLL VENOUS BLD VENIPUNCTURE: CPT

## 2024-06-24 PROCEDURE — 85027 COMPLETE CBC AUTOMATED: CPT

## 2024-06-24 PROCEDURE — 93005 ELECTROCARDIOGRAM TRACING: CPT

## 2024-06-24 PROCEDURE — 80048 BASIC METABOLIC PNL TOTAL CA: CPT

## 2024-06-24 ASSESSMENT — ENCOUNTER SYMPTOMS
EYES NEGATIVE: 1
ALLERGIC/IMMUNOLOGIC NEGATIVE: 1
GASTROINTESTINAL NEGATIVE: 1
RESPIRATORY NEGATIVE: 1
HEMATOLOGIC/LYMPHATIC NEGATIVE: 1
NEUROLOGICAL NEGATIVE: 1
PSYCHIATRIC NEGATIVE: 1
CONSTITUTIONAL NEGATIVE: 1
MUSCULOSKELETAL NEGATIVE: 1
ENDOCRINE NEGATIVE: 1

## 2024-06-24 ASSESSMENT — DUKE ACTIVITY SCORE INDEX (DASI)
CAN YOU DO LIGHT WORK AROUND THE HOUSE LIKE DUSTING OR WASHING DISHES: YES
CAN YOU PARTICIPATE IN STRENOUS SPORTS LIKE SWIMMING, SINGLES TENNIS, FOOTBALL, BASKETBALL, OR SKIING: YES
CAN YOU TAKE CARE OF YOURSELF (EAT, DRESS, BATHE, OR USE TOILET): YES
CAN YOU DO MODERATE WORK AROUND THE HOUSE LIKE VACUUMING, SWEEPING FLOORS OR CARRYING GROCERIES: NO
CAN YOU WALK INDOORS, SUCH AS AROUND YOUR HOUSE: YES
CAN YOU RUN A SHORT DISTANCE: NO
CAN YOU DO YARD WORK LIKE RAKING LEAVES, WEEDING OR PUSHING A MOWER: NO
CAN YOU DO HEAVY WORK AROUND THE HOUSE LIKE SCRUBBING FLOORS OR LIFTING AND MOVING HEAVY FURNITURE: NO
CAN YOU WALK A BLOCK OR TWO ON LEVEL GROUND: NO
CAN YOU CLIMB A FLIGHT OF STAIRS OR WALK UP A HILL: YES
CAN YOU PARTICIPATE IN MODERATE RECREATIONAL ACTIVITIES LIKE GOLF, BOWLING, DANCING, DOUBLES TENNIS OR THROWING A BASEBALL OR FOOTBALL: NO

## 2024-06-24 ASSESSMENT — LIFESTYLE VARIABLES: SMOKING_STATUS: NONSMOKER

## 2024-06-24 NOTE — CPM/PAT H&P
CPM/PAT Evaluation       Name: Alexander Marin (Alexander Marin)  /Age: 1975/48 y.o.     In-Person       Chief Complaint: Left leg pain, edema. Scheduled for left greater saphenous vein radiofrequency ablation under MAC anesthesia per Dr. Main on 24.       Past Medical History:   Diagnosis Date    Acute medial meniscal tear 2023    Adenomatous polyp of colon 2023    Anemia 2023    Arthritis or polyarthritis, rheumatoid (Multi) 2023    Asthma (HHS-HCC) 2023    Bilateral knee pain 2023    Bruise 2023    Bursitis of right knee 2023    Calcaneal spur of left foot 2023    Chronic diarrhea 2023    Chronic midline low back pain without sciatica 2023    Decreased range of motion of right knee 2023    Depression 2023    Diverticulosis of colon 2023    Gait abnormality 2023    Impaired strength of lower extremity 2023    Knee injury 2023    Numbness and tingling 2023    Obesity, Class III, BMI 40-49.9 (morbid obesity) (Multi) 2023    Patellar tendinitis of right knee 2023    Polyphagia 2023    Rheumatoid arthritis (Multi)     Rheumatoid arthritis involving both knees (Multi) 2023    Right upper quadrant abdominal tenderness 2023    Sinus congestion 2023    Tenderness of left calf 2023    Vitamin B12 deficiency 2023    Vitamin D deficiency 2023    Weakness of both lower extremities 2023       Past Surgical History:   Procedure Laterality Date    CARPAL TUNNEL RELEASE Bilateral     CHOLECYSTECTOMY  2016    Cholecystectomy    DILATION AND CURETTAGE OF UTERUS  2016    Dilation And Curettage    OTHER SURGICAL HISTORY  2016    Ear Surgery    OTHER SURGICAL HISTORY  2016    Oral Surgery    OTHER SURGICAL HISTORY  2020    Knee arthroscopy       Patient  reports that she is not currently sexually active. She reports  using the following method of birth control/protection: Post-menopausal.    Family History   Problem Relation Name Age of Onset    Other (pre diabetes) Mother      Hypertension Father      Hyperlipidemia Father      Prostate cancer Father      Diabetes Father's Brother      Colon cancer Father's Brother      Breast cancer Paternal Grandmother      Diabetes Other      Other (pre diabetes) Other      Breast cancer Other      Colon cancer Other      Prostate cancer Other      Hypertension Other      Hyperlipidemia Other         Allergies   Allergen Reactions    Bee Venom Protein (Honey Bee) Anaphylaxis    Fish Containing Products Anaphylaxis    Naproxen Other     CHEST PAIN    Sulfamethoxazole-Trimethoprim Unknown    Watermelon Swelling    Meperidine Hives and Palpitations     TACHYCARDIA WITH RASH    Promethazine Hives and Palpitations       Prior to Admission medications    Medication Sig Start Date End Date Taking? Authorizing Provider   cholecalciferol (Vitamin D-3) 25 MCG (1000 UT) capsule Take 1 capsule (25 mcg) by mouth once daily. 12/21/23  Yes Serge Marie MD   cyanocobalamin, vitamin B-12, (Vitamin B-12) 1,000 mcg tablet extended release Take 1 tablet (1,000 mcg) by mouth once daily. 12/21/23  Yes Serge Marie MD   DULoxetine (Cymbalta) 30 mg DR capsule Take 1 capsule (30 mg) by mouth once daily. Do not crush or chew.   Yes Historical Provider, MD   DULoxetine (Cymbalta) 60 mg DR capsule Take 1 capsule (60 mg) by mouth once daily. Unknown of up dosage 3/20/23  Yes Historical Provider, MD   folic acid (Folvite) 1 mg tablet Take 1 tablet (1 mg) by mouth once daily. 12/21/23  Yes Serge Marie MD   hydroxychloroquine (Plaquenil) 200 mg tablet Take 1 tablet (200 mg) by mouth 2 times a day. 2/8/23  Yes Historical Provider, MD   traZODone (Desyrel) 100 mg tablet TAKE 1/2 (ONE-HALF) TO 1 (ONE) TABLET BY MOUTH FOR HEADACHE AS NEEDED for sleep 3/20/23  Yes Historical Provider, MD   turmeric  root extract 500 mg tablet Take by mouth.   Yes Historical Provider, MD   albuterol 2.5 mg /3 mL (0.083 %) nebulizer solution Take 3 mL (2.5 mg) by nebulization 4 times a day as needed for wheezing or shortness of breath.  Patient not taking: Reported on 6/24/2024 3/20/24 3/20/25  Serge Marie MD   albuterol 90 mcg/actuation inhaler INHALE 1 (ONE) PUFF BY MOUTH EVERY FOUR HOURS AS NEEDED.  Patient not taking: Reported on 6/24/2024 5/29/24   Serge Marie MD       Visit Vitals  BP (!) 132/92   Pulse 94   Temp 36.6 °C (97.9 °F) (Temporal)   Resp 20       DASI Risk Score    No data to display       Caprini DVT Assessment      Flowsheet Row Most Recent Value   DVT Score 13   Labs/Test Results Other Thrombophilia   Current Status COPD, Swollen legs, Major surgery planned, lasting 2-3 hours   History COPD   Age 40-59 years   BMI 41-50 (Morbid obesity)          Modified Frailty Index    No data to display       CHADS2 Stroke Risk  Current as of 26 minutes ago        N/A 3 to 100%: High Risk   2 to < 3%: Medium Risk   0 to < 2%: Low Risk     Last Change: N/A          This score determines the patient's risk of having a stroke if the patient has atrial fibrillation.        This score is not applicable to this patient. Components are not calculated.          Revised Cardiac Risk Index      Flowsheet Row Most Recent Value   Revised Cardiac Risk Calculator 1          Apfel Simplified Score      Flowsheet Row Most Recent Value   Apfel Simplified Score Calculator 2          Risk Analysis Index Results This Encounter    No data found in the last 1 encounters.       Stop Bang Score      Flowsheet Row Most Recent Value   Do you snore loudly? 0   Do you often feel tired or fatigued after your sleep? 0   Has anyone ever observed you stop breathing in your sleep? 0   Do you have or are you being treated for high blood pressure? 0   Recent BMI (Calculated) 47.5   Is BMI greater than 35 kg/m2? 1=Yes   Age older than 50  years old? 0=No   Is your neck circumference greater than 17 inches (Male) or 16 inches (Female)? 1   Gender - Male 0=No   STOP-BANG Total Score 2            Assessment and Plan:     Cardiovascular:Left leg pain, edema. Scheduled for left greater saphenous vein radiofrequency ablation under MAC anesthesia per Dr. Main on 6/28/24. See H&P.

## 2024-06-24 NOTE — H&P
History Of Present Illness  Alexander Marin is a very pleasant 48 y.o. female presenting with Left leg pain, edema. Scheduled for left greater saphenous vein radiofrequency ablation under MAC anesthesia per Dr. Main on 6/28/24.      Past Medical History  Past Medical History:   Diagnosis Date    Acute medial meniscal tear 04/26/2023    Adenomatous polyp of colon 04/26/2023    Anemia 04/26/2023    Arthritis or polyarthritis, rheumatoid (Multi) 04/26/2023    Asthma (HHS-HCC) 04/26/2023    Bilateral knee pain 04/26/2023    Bruise 04/26/2023    Bursitis of right knee 04/26/2023    Calcaneal spur of left foot 04/26/2023    Chronic diarrhea 04/26/2023    Chronic midline low back pain without sciatica 04/26/2023    Decreased range of motion of right knee 04/26/2023    Depression 04/26/2023    Diverticulosis of colon 04/26/2023    Gait abnormality 04/26/2023    Impaired strength of lower extremity 04/26/2023    Knee injury 04/26/2023    Numbness and tingling 04/26/2023    Obesity, Class III, BMI 40-49.9 (morbid obesity) (Multi) 04/26/2023    Patellar tendinitis of right knee 04/26/2023    Polyphagia 04/26/2023    Rheumatoid arthritis (Multi)     Rheumatoid arthritis involving both knees (Multi) 04/26/2023    Right upper quadrant abdominal tenderness 04/26/2023    Sinus congestion 04/26/2023    Tenderness of left calf 04/26/2023    Vitamin B12 deficiency 04/26/2023    Vitamin D deficiency 04/26/2023    Weakness of both lower extremities 04/26/2023   Sarcoidosis     Surgical History  Past Surgical History:   Procedure Laterality Date    CARPAL TUNNEL RELEASE Bilateral     CHOLECYSTECTOMY  09/23/2016    Cholecystectomy    DILATION AND CURETTAGE OF UTERUS  09/23/2016    Dilation And Curettage    OTHER SURGICAL HISTORY  09/23/2016    Ear Surgery    OTHER SURGICAL HISTORY  09/23/2016    Oral Surgery    OTHER SURGICAL HISTORY  06/25/2020    Knee arthroscopy        Social History  She reports that she has never smoked. She has  never used smokeless tobacco. She reports that she does not drink alcohol and does not use drugs.    Family History  Family History   Problem Relation Name Age of Onset    Other (pre diabetes) Mother      Hypertension Father      Hyperlipidemia Father      Prostate cancer Father      Diabetes Father's Brother      Colon cancer Father's Brother      Breast cancer Paternal Grandmother      Diabetes Other      Other (pre diabetes) Other      Breast cancer Other      Colon cancer Other      Prostate cancer Other      Hypertension Other      Hyperlipidemia Other          Allergies  Bee venom protein (honey bee), Fish containing products, Naproxen, Sulfamethoxazole-trimethoprim, Watermelon, Meperidine, and Promethazine    Review of Systems   Constitutional: Negative.    HENT: Negative.     Eyes: Negative.    Respiratory: Negative.     Cardiovascular:  Positive for leg swelling.        Left leg edema, varicosities. Pain.    Gastrointestinal: Negative.    Endocrine: Negative.    Genitourinary: Negative.    Musculoskeletal: Negative.    Skin: Negative.    Allergic/Immunologic: Negative.    Neurological: Negative.    Hematological: Negative.    Psychiatric/Behavioral: Negative.     All other systems reviewed and are negative.       Physical Exam  Vitals and nursing note reviewed.   Constitutional:       Appearance: Normal appearance. She is obese.   HENT:      Head: Normocephalic.      Nose: Nose normal.      Mouth/Throat:      Comments:   Mallampati: 1  TMD: >3  Finger breadth: 3  Dentition:  normal  Neck ROM: full  Nose ring; right nares  Eyes:      Pupils: Pupils are equal, round, and reactive to light.   Cardiovascular:      Rate and Rhythm: Normal rate and regular rhythm.      Heart sounds: Normal heart sounds, S1 normal and S2 normal.      Comments: Hx left leg edema. Varicosities noted behind the left knee and down the left calf  Pulmonary:      Effort: Pulmonary effort is normal.      Breath sounds: Normal breath  "sounds.      Comments: Lungs clear throughout all fields.   Abdominal:      General: Bowel sounds are normal.      Palpations: Abdomen is soft.      Comments: Bowel sounds active x4 quads.    Musculoskeletal:         General: Normal range of motion.      Cervical back: Normal range of motion.      Right lower leg: No edema.      Left lower leg: No edema.   Skin:     General: Skin is warm and dry.   Neurological:      General: No focal deficit present.      Mental Status: She is alert and oriented to person, place, and time.   Psychiatric:         Mood and Affect: Mood normal.         Behavior: Behavior normal.         Thought Content: Thought content normal.         Judgment: Judgment normal.          Last Recorded Vitals  Blood pressure (!) 132/92, pulse 94, temperature 36.6 °C (97.9 °F), temperature source Temporal, resp. rate 20, height 1.549 m (5' 1\"), weight 114 kg (251 lb), SpO2 95%.    Relevant Results    Current Outpatient Medications:     cholecalciferol (Vitamin D-3) 25 MCG (1000 UT) capsule, Take 1 capsule (25 mcg) by mouth once daily., Disp: 90 capsule, Rfl: 1    cyanocobalamin, vitamin B-12, (Vitamin B-12) 1,000 mcg tablet extended release, Take 1 tablet (1,000 mcg) by mouth once daily., Disp: 90 tablet, Rfl: 1    DULoxetine (Cymbalta) 30 mg DR capsule, Take 1 capsule (30 mg) by mouth once daily. Do not crush or chew., Disp: , Rfl:     DULoxetine (Cymbalta) 60 mg DR capsule, Take 1 capsule (60 mg) by mouth once daily. Unknown of up dosage, Disp: , Rfl:     folic acid (Folvite) 1 mg tablet, Take 1 tablet (1 mg) by mouth once daily., Disp: 90 tablet, Rfl: 1    hydroxychloroquine (Plaquenil) 200 mg tablet, Take 1 tablet (200 mg) by mouth 2 times a day., Disp: , Rfl:     traZODone (Desyrel) 100 mg tablet, TAKE 1/2 (ONE-HALF) TO 1 (ONE) TABLET BY MOUTH FOR HEADACHE AS NEEDED for sleep, Disp: , Rfl:     turmeric root extract 500 mg tablet, Take by mouth., Disp: , Rfl:     albuterol 2.5 mg /3 mL (0.083 %) " nebulizer solution, Take 3 mL (2.5 mg) by nebulization 4 times a day as needed for wheezing or shortness of breath. (Patient not taking: Reported on 6/24/2024), Disp: 180 mL, Rfl: 3    albuterol 90 mcg/actuation inhaler, INHALE 1 (ONE) PUFF BY MOUTH EVERY FOUR HOURS AS NEEDED. (Patient not taking: Reported on 6/24/2024), Disp: 8.5 g, Rfl: 5       Results for orders placed or performed in visit on 06/24/24 (from the past 24 hour(s))   ECG 12 Lead   Result Value Ref Range    Ventricular Rate 84 BPM    Atrial Rate 84 BPM    AZ Interval 182 ms    QRS Duration 94 ms    QT Interval 392 ms    QTC Calculation(Bazett) 464 ms    P Axis 1 degrees    R Axis 21 degrees    T Axis 17 degrees    QRS Count 14 beats    Q Onset 249 ms    T Offset 445 ms    QTC Fredericia 438 ms   CBC   Result Value Ref Range    WBC 7.8 4.4 - 11.3 x10*3/uL    nRBC 0.0 0.0 - 0.0 /100 WBCs    RBC 4.26 4.00 - 5.20 x10*6/uL    Hemoglobin 12.0 12.0 - 16.0 g/dL    Hematocrit 36.5 36.0 - 46.0 %    MCV 86 80 - 100 fL    MCH 28.2 26.0 - 34.0 pg    MCHC 32.9 32.0 - 36.0 g/dL    RDW 14.4 11.5 - 14.5 %    Platelets 290 150 - 450 x10*3/uL   Basic Metabolic Panel   Result Value Ref Range    Glucose 106 (H) 74 - 99 mg/dL    Sodium 139 136 - 145 mmol/L    Potassium 3.6 3.5 - 5.3 mmol/L    Chloride 107 98 - 107 mmol/L    Bicarbonate 25 21 - 32 mmol/L    Anion Gap 11 10 - 20 mmol/L    Urea Nitrogen 15 6 - 23 mg/dL    Creatinine 0.73 0.50 - 1.05 mg/dL    eGFR >90 >60 mL/min/1.73m*2    Calcium 8.7 8.6 - 10.3 mg/dL               Assessment/Plan   Problem List Items Addressed This Visit       Anemia    Relevant Orders    CBC (Completed)    Obesity, Class III, BMI 40-49.9 (morbid obesity) (Multi)    Relevant Orders    ECG 12 Lead (Completed)    Ankle swelling    Relevant Orders    Basic Metabolic Panel (Completed)    CBC (Completed)    ECG 12 Lead (Completed)    Chronic obstructive pulmonary disease (Multi)    Relevant Orders    ECG 12 Lead (Completed)     Other Visit  Diagnoses       Pre-op evaluation    -  Primary    Relevant Orders    Basic Metabolic Panel (Completed)    CBC (Completed)    ECG 12 Lead (Completed)          Left leg pain, edema. Scheduled for left greater saphenous vein radiofrequency ablation under MAC anesthesia per Dr. Main on 6/28/24.   CBC, BMP ordered. Reviewed. WNL.   EKG ordered, shows SR.   All surgery instructions reviewed with patient by RN. Verbalized understanding.        I spent 30 minutes in the professional and overall care of this patient.      Elis Queen, JOVANNY-CNS

## 2024-06-24 NOTE — PREPROCEDURE INSTRUCTIONS
Medication List            Accurate as of June 24, 2024  9:25 AM. Always use your most recent med list.                * albuterol 2.5 mg /3 mL (0.083 %) nebulizer solution  Take 3 mL (2.5 mg) by nebulization 4 times a day as needed for wheezing or shortness of breath.     * albuterol 90 mcg/actuation inhaler  INHALE 1 (ONE) PUFF BY MOUTH EVERY FOUR HOURS AS NEEDED.     cholecalciferol 25 MCG (1000 UT) capsule  Commonly known as: Vitamin D-3  Take 1 capsule (25 mcg) by mouth once daily.     cyanocobalamin (vitamin B-12) 1,000 mcg tablet extended release  Commonly known as: Vitamin B-12  Take 1 tablet (1,000 mcg) by mouth once daily.     * DULoxetine 30 mg DR capsule  Commonly known as: Cymbalta     * DULoxetine 60 mg DR capsule  Commonly known as: Cymbalta     folic acid 1 mg tablet  Commonly known as: Folvite  Take 1 tablet (1 mg) by mouth once daily.     hydroxychloroquine 200 mg tablet  Commonly known as: Plaquenil     traZODone 100 mg tablet  Commonly known as: Desyrel     turmeric root extract 500 mg tablet           * This list has 4 medication(s) that are the same as other medications prescribed for you. Read the directions carefully, and ask your doctor or other care provider to review them with you.                                  NPO Instructions:    Do not eat any food after midnight the night before your surgery/procedure.    Additional Instructions:     Wear  comfortable loose fitting clothing  Do not use moisturizers, creams, lotions or perfume  All jewelry and valuables should be left at home    Must have a   Stop vitamins one as of today   6/24  May use albuterol inhaler in the morning of surgery  May continue plaquenil until night before surgery  Do not take aleve this week

## 2024-06-24 NOTE — H&P (VIEW-ONLY)
History Of Present Illness  Alexander Marin is a very pleasant 48 y.o. female presenting with Left leg pain, edema. Scheduled for left greater saphenous vein radiofrequency ablation under MAC anesthesia per Dr. Main on 6/28/24.      Past Medical History  Past Medical History:   Diagnosis Date    Acute medial meniscal tear 04/26/2023    Adenomatous polyp of colon 04/26/2023    Anemia 04/26/2023    Arthritis or polyarthritis, rheumatoid (Multi) 04/26/2023    Asthma (HHS-HCC) 04/26/2023    Bilateral knee pain 04/26/2023    Bruise 04/26/2023    Bursitis of right knee 04/26/2023    Calcaneal spur of left foot 04/26/2023    Chronic diarrhea 04/26/2023    Chronic midline low back pain without sciatica 04/26/2023    Decreased range of motion of right knee 04/26/2023    Depression 04/26/2023    Diverticulosis of colon 04/26/2023    Gait abnormality 04/26/2023    Impaired strength of lower extremity 04/26/2023    Knee injury 04/26/2023    Numbness and tingling 04/26/2023    Obesity, Class III, BMI 40-49.9 (morbid obesity) (Multi) 04/26/2023    Patellar tendinitis of right knee 04/26/2023    Polyphagia 04/26/2023    Rheumatoid arthritis (Multi)     Rheumatoid arthritis involving both knees (Multi) 04/26/2023    Right upper quadrant abdominal tenderness 04/26/2023    Sinus congestion 04/26/2023    Tenderness of left calf 04/26/2023    Vitamin B12 deficiency 04/26/2023    Vitamin D deficiency 04/26/2023    Weakness of both lower extremities 04/26/2023   Sarcoidosis     Surgical History  Past Surgical History:   Procedure Laterality Date    CARPAL TUNNEL RELEASE Bilateral     CHOLECYSTECTOMY  09/23/2016    Cholecystectomy    DILATION AND CURETTAGE OF UTERUS  09/23/2016    Dilation And Curettage    OTHER SURGICAL HISTORY  09/23/2016    Ear Surgery    OTHER SURGICAL HISTORY  09/23/2016    Oral Surgery    OTHER SURGICAL HISTORY  06/25/2020    Knee arthroscopy        Social History  She reports that she has never smoked. She has  never used smokeless tobacco. She reports that she does not drink alcohol and does not use drugs.    Family History  Family History   Problem Relation Name Age of Onset    Other (pre diabetes) Mother      Hypertension Father      Hyperlipidemia Father      Prostate cancer Father      Diabetes Father's Brother      Colon cancer Father's Brother      Breast cancer Paternal Grandmother      Diabetes Other      Other (pre diabetes) Other      Breast cancer Other      Colon cancer Other      Prostate cancer Other      Hypertension Other      Hyperlipidemia Other          Allergies  Bee venom protein (honey bee), Fish containing products, Naproxen, Sulfamethoxazole-trimethoprim, Watermelon, Meperidine, and Promethazine    Review of Systems   Constitutional: Negative.    HENT: Negative.     Eyes: Negative.    Respiratory: Negative.     Cardiovascular:  Positive for leg swelling.        Left leg edema, varicosities. Pain.    Gastrointestinal: Negative.    Endocrine: Negative.    Genitourinary: Negative.    Musculoskeletal: Negative.    Skin: Negative.    Allergic/Immunologic: Negative.    Neurological: Negative.    Hematological: Negative.    Psychiatric/Behavioral: Negative.     All other systems reviewed and are negative.       Physical Exam  Vitals and nursing note reviewed.   Constitutional:       Appearance: Normal appearance. She is obese.   HENT:      Head: Normocephalic.      Nose: Nose normal.      Mouth/Throat:      Comments:   Mallampati: 1  TMD: >3  Finger breadth: 3  Dentition:  normal  Neck ROM: full  Nose ring; right nares  Eyes:      Pupils: Pupils are equal, round, and reactive to light.   Cardiovascular:      Rate and Rhythm: Normal rate and regular rhythm.      Heart sounds: Normal heart sounds, S1 normal and S2 normal.      Comments: Hx left leg edema. Varicosities noted behind the left knee and down the left calf  Pulmonary:      Effort: Pulmonary effort is normal.      Breath sounds: Normal breath  "sounds.      Comments: Lungs clear throughout all fields.   Abdominal:      General: Bowel sounds are normal.      Palpations: Abdomen is soft.      Comments: Bowel sounds active x4 quads.    Musculoskeletal:         General: Normal range of motion.      Cervical back: Normal range of motion.      Right lower leg: No edema.      Left lower leg: No edema.   Skin:     General: Skin is warm and dry.   Neurological:      General: No focal deficit present.      Mental Status: She is alert and oriented to person, place, and time.   Psychiatric:         Mood and Affect: Mood normal.         Behavior: Behavior normal.         Thought Content: Thought content normal.         Judgment: Judgment normal.          Last Recorded Vitals  Blood pressure (!) 132/92, pulse 94, temperature 36.6 °C (97.9 °F), temperature source Temporal, resp. rate 20, height 1.549 m (5' 1\"), weight 114 kg (251 lb), SpO2 95%.    Relevant Results    Current Outpatient Medications:     cholecalciferol (Vitamin D-3) 25 MCG (1000 UT) capsule, Take 1 capsule (25 mcg) by mouth once daily., Disp: 90 capsule, Rfl: 1    cyanocobalamin, vitamin B-12, (Vitamin B-12) 1,000 mcg tablet extended release, Take 1 tablet (1,000 mcg) by mouth once daily., Disp: 90 tablet, Rfl: 1    DULoxetine (Cymbalta) 30 mg DR capsule, Take 1 capsule (30 mg) by mouth once daily. Do not crush or chew., Disp: , Rfl:     DULoxetine (Cymbalta) 60 mg DR capsule, Take 1 capsule (60 mg) by mouth once daily. Unknown of up dosage, Disp: , Rfl:     folic acid (Folvite) 1 mg tablet, Take 1 tablet (1 mg) by mouth once daily., Disp: 90 tablet, Rfl: 1    hydroxychloroquine (Plaquenil) 200 mg tablet, Take 1 tablet (200 mg) by mouth 2 times a day., Disp: , Rfl:     traZODone (Desyrel) 100 mg tablet, TAKE 1/2 (ONE-HALF) TO 1 (ONE) TABLET BY MOUTH FOR HEADACHE AS NEEDED for sleep, Disp: , Rfl:     turmeric root extract 500 mg tablet, Take by mouth., Disp: , Rfl:     albuterol 2.5 mg /3 mL (0.083 %) " nebulizer solution, Take 3 mL (2.5 mg) by nebulization 4 times a day as needed for wheezing or shortness of breath. (Patient not taking: Reported on 6/24/2024), Disp: 180 mL, Rfl: 3    albuterol 90 mcg/actuation inhaler, INHALE 1 (ONE) PUFF BY MOUTH EVERY FOUR HOURS AS NEEDED. (Patient not taking: Reported on 6/24/2024), Disp: 8.5 g, Rfl: 5       Results for orders placed or performed in visit on 06/24/24 (from the past 24 hour(s))   ECG 12 Lead   Result Value Ref Range    Ventricular Rate 84 BPM    Atrial Rate 84 BPM    NC Interval 182 ms    QRS Duration 94 ms    QT Interval 392 ms    QTC Calculation(Bazett) 464 ms    P Axis 1 degrees    R Axis 21 degrees    T Axis 17 degrees    QRS Count 14 beats    Q Onset 249 ms    T Offset 445 ms    QTC Fredericia 438 ms   CBC   Result Value Ref Range    WBC 7.8 4.4 - 11.3 x10*3/uL    nRBC 0.0 0.0 - 0.0 /100 WBCs    RBC 4.26 4.00 - 5.20 x10*6/uL    Hemoglobin 12.0 12.0 - 16.0 g/dL    Hematocrit 36.5 36.0 - 46.0 %    MCV 86 80 - 100 fL    MCH 28.2 26.0 - 34.0 pg    MCHC 32.9 32.0 - 36.0 g/dL    RDW 14.4 11.5 - 14.5 %    Platelets 290 150 - 450 x10*3/uL   Basic Metabolic Panel   Result Value Ref Range    Glucose 106 (H) 74 - 99 mg/dL    Sodium 139 136 - 145 mmol/L    Potassium 3.6 3.5 - 5.3 mmol/L    Chloride 107 98 - 107 mmol/L    Bicarbonate 25 21 - 32 mmol/L    Anion Gap 11 10 - 20 mmol/L    Urea Nitrogen 15 6 - 23 mg/dL    Creatinine 0.73 0.50 - 1.05 mg/dL    eGFR >90 >60 mL/min/1.73m*2    Calcium 8.7 8.6 - 10.3 mg/dL               Assessment/Plan   Problem List Items Addressed This Visit       Anemia    Relevant Orders    CBC (Completed)    Obesity, Class III, BMI 40-49.9 (morbid obesity) (Multi)    Relevant Orders    ECG 12 Lead (Completed)    Ankle swelling    Relevant Orders    Basic Metabolic Panel (Completed)    CBC (Completed)    ECG 12 Lead (Completed)    Chronic obstructive pulmonary disease (Multi)    Relevant Orders    ECG 12 Lead (Completed)     Other Visit  Diagnoses       Pre-op evaluation    -  Primary    Relevant Orders    Basic Metabolic Panel (Completed)    CBC (Completed)    ECG 12 Lead (Completed)          Left leg pain, edema. Scheduled for left greater saphenous vein radiofrequency ablation under MAC anesthesia per Dr. Main on 6/28/24.   CBC, BMP ordered. Reviewed. WNL.   EKG ordered, shows SR.   All surgery instructions reviewed with patient by RN. Verbalized understanding.        I spent 30 minutes in the professional and overall care of this patient.      Elis Queen, JOVANNY-CNS

## 2024-06-25 ENCOUNTER — TELEPHONE (OUTPATIENT)
Dept: PRIMARY CARE | Facility: CLINIC | Age: 49
End: 2024-06-25
Payer: MEDICARE

## 2024-06-25 ENCOUNTER — CLINICAL SUPPORT (OUTPATIENT)
Dept: NUTRITION | Facility: HOSPITAL | Age: 49
End: 2024-06-25
Payer: MEDICARE

## 2024-06-25 DIAGNOSIS — E66.01 OBESITY, CLASS III, BMI 40-49.9 (MORBID OBESITY) (MULTI): Primary | ICD-10-CM

## 2024-06-25 DIAGNOSIS — Z72.4 INAPPROPRIATE DIET AND EATING HABITS: ICD-10-CM

## 2024-06-25 NOTE — PROGRESS NOTES
[employer]  Diet Recommendation 1: Diabetes  Diet Recommendation 2: Calories, Low  Nutrition Goals Stated: Getting surgery on 6/28  Household Size: 8 Members (4 Max/Household)  Interventions: Referral Number: 2nd 6 Mo Referral 1 yr (Referrals may not be consecutive)  Interventions: Visit Number: 5 of 6 Visits - Max 6 Visits/Referral Each 6 Mo Period (called doctor today about renewal)  Education Today: Healthy Recipes  Recipes Today: strawberry spinach salad  Grains: 50-75% Whole  Fruit: 50-75% Fresh  Vegetables: 50-75% Fresh  Proteins: 1-2 Plant-based Items  Dairy: Lowfat - 100%  Initials of RD Assisting Today: / FRANCY

## 2024-06-28 ENCOUNTER — HOSPITAL ENCOUNTER (OUTPATIENT)
Facility: HOSPITAL | Age: 49
Setting detail: OUTPATIENT SURGERY
Discharge: HOME | End: 2024-06-28
Attending: SURGERY | Admitting: SURGERY
Payer: MEDICARE

## 2024-06-28 ENCOUNTER — ANESTHESIA (OUTPATIENT)
Dept: OPERATING ROOM | Facility: HOSPITAL | Age: 49
End: 2024-06-28
Payer: MEDICARE

## 2024-06-28 VITALS
RESPIRATION RATE: 17 BRPM | DIASTOLIC BLOOD PRESSURE: 85 MMHG | HEART RATE: 70 BPM | SYSTOLIC BLOOD PRESSURE: 131 MMHG | OXYGEN SATURATION: 92 % | HEIGHT: 62 IN | BODY MASS INDEX: 46.01 KG/M2 | TEMPERATURE: 97.8 F | WEIGHT: 250 LBS

## 2024-06-28 DIAGNOSIS — I83.812 VARICOSE VEINS OF LEFT LOWER EXTREMITY WITH PAIN: ICD-10-CM

## 2024-06-28 DIAGNOSIS — M79.89 LEG SWELLING: Primary | ICD-10-CM

## 2024-06-28 LAB
GLUCOSE BLD MANUAL STRIP-MCNC: 97 MG/DL (ref 74–99)
PREGNANCY TEST URINE, POC: NEGATIVE

## 2024-06-28 PROCEDURE — 81025 URINE PREGNANCY TEST: CPT | Performed by: ANESTHESIOLOGY

## 2024-06-28 PROCEDURE — 3700000001 HC GENERAL ANESTHESIA TIME - INITIAL BASE CHARGE: Performed by: SURGERY

## 2024-06-28 PROCEDURE — 36475 ENDOVENOUS RF 1ST VEIN: CPT | Performed by: SURGERY

## 2024-06-28 PROCEDURE — 3600000005 HC OR TIME - INITIAL BASE CHARGE - PROCEDURE LEVEL FIVE: Performed by: SURGERY

## 2024-06-28 PROCEDURE — 2500000004 HC RX 250 GENERAL PHARMACY W/ HCPCS (ALT 636 FOR OP/ED): Performed by: SURGERY

## 2024-06-28 PROCEDURE — 3600000010 HC OR TIME - EACH INCREMENTAL 1 MINUTE - PROCEDURE LEVEL FIVE: Performed by: SURGERY

## 2024-06-28 PROCEDURE — 2720000007 HC OR 272 NO HCPCS: Performed by: SURGERY

## 2024-06-28 PROCEDURE — 7100000010 HC PHASE TWO TIME - EACH INCREMENTAL 1 MINUTE: Performed by: SURGERY

## 2024-06-28 PROCEDURE — 82947 ASSAY GLUCOSE BLOOD QUANT: CPT

## 2024-06-28 PROCEDURE — 7100000002 HC RECOVERY ROOM TIME - EACH INCREMENTAL 1 MINUTE: Performed by: SURGERY

## 2024-06-28 PROCEDURE — 3700000002 HC GENERAL ANESTHESIA TIME - EACH INCREMENTAL 1 MINUTE: Performed by: SURGERY

## 2024-06-28 PROCEDURE — C1888 ENDOVAS NON-CARDIAC ABL CATH: HCPCS | Performed by: SURGERY

## 2024-06-28 PROCEDURE — 7100000001 HC RECOVERY ROOM TIME - INITIAL BASE CHARGE: Performed by: SURGERY

## 2024-06-28 PROCEDURE — 2500000005 HC RX 250 GENERAL PHARMACY W/O HCPCS: Performed by: SURGERY

## 2024-06-28 PROCEDURE — C1894 INTRO/SHEATH, NON-LASER: HCPCS | Performed by: SURGERY

## 2024-06-28 PROCEDURE — 2500000005 HC RX 250 GENERAL PHARMACY W/O HCPCS: Performed by: NURSE ANESTHETIST, CERTIFIED REGISTERED

## 2024-06-28 PROCEDURE — 2500000004 HC RX 250 GENERAL PHARMACY W/ HCPCS (ALT 636 FOR OP/ED): Performed by: ANESTHESIOLOGY

## 2024-06-28 PROCEDURE — 2500000004 HC RX 250 GENERAL PHARMACY W/ HCPCS (ALT 636 FOR OP/ED): Performed by: NURSE ANESTHETIST, CERTIFIED REGISTERED

## 2024-06-28 PROCEDURE — 7100000009 HC PHASE TWO TIME - INITIAL BASE CHARGE: Performed by: SURGERY

## 2024-06-28 RX ORDER — ONDANSETRON HYDROCHLORIDE 2 MG/ML
4 INJECTION, SOLUTION INTRAVENOUS ONCE AS NEEDED
Status: DISCONTINUED | OUTPATIENT
Start: 2024-06-28 | End: 2024-06-28 | Stop reason: HOSPADM

## 2024-06-28 RX ORDER — ACETAMINOPHEN 325 MG/1
975 TABLET ORAL ONCE
Status: COMPLETED | OUTPATIENT
Start: 2024-06-28 | End: 2024-06-28

## 2024-06-28 RX ORDER — MIDAZOLAM HYDROCHLORIDE 1 MG/ML
INJECTION, SOLUTION INTRAMUSCULAR; INTRAVENOUS AS NEEDED
Status: DISCONTINUED | OUTPATIENT
Start: 2024-06-28 | End: 2024-06-28

## 2024-06-28 RX ORDER — MORPHINE SULFATE 4 MG/ML
4 INJECTION INTRAVENOUS EVERY 5 MIN PRN
Status: DISCONTINUED | OUTPATIENT
Start: 2024-06-28 | End: 2024-06-28 | Stop reason: HOSPADM

## 2024-06-28 RX ORDER — ALBUTEROL SULFATE 0.83 MG/ML
2.5 SOLUTION RESPIRATORY (INHALATION) ONCE
Status: DISCONTINUED | OUTPATIENT
Start: 2024-06-28 | End: 2024-06-28 | Stop reason: HOSPADM

## 2024-06-28 RX ORDER — PROPOFOL 10 MG/ML
INJECTION, EMULSION INTRAVENOUS AS NEEDED
Status: DISCONTINUED | OUTPATIENT
Start: 2024-06-28 | End: 2024-06-28

## 2024-06-28 RX ORDER — MORPHINE SULFATE 2 MG/ML
2 INJECTION, SOLUTION INTRAMUSCULAR; INTRAVENOUS EVERY 5 MIN PRN
Status: DISCONTINUED | OUTPATIENT
Start: 2024-06-28 | End: 2024-06-28 | Stop reason: HOSPADM

## 2024-06-28 RX ORDER — FENTANYL CITRATE 50 UG/ML
INJECTION, SOLUTION INTRAMUSCULAR; INTRAVENOUS AS NEEDED
Status: DISCONTINUED | OUTPATIENT
Start: 2024-06-28 | End: 2024-06-28

## 2024-06-28 RX ORDER — SODIUM CHLORIDE, SODIUM LACTATE, POTASSIUM CHLORIDE, CALCIUM CHLORIDE 600; 310; 30; 20 MG/100ML; MG/100ML; MG/100ML; MG/100ML
50 INJECTION, SOLUTION INTRAVENOUS CONTINUOUS
Status: DISCONTINUED | OUTPATIENT
Start: 2024-06-28 | End: 2024-06-28 | Stop reason: HOSPADM

## 2024-06-28 RX ORDER — LIDOCAINE HCL/PF 100 MG/5ML
SYRINGE (ML) INTRAVENOUS AS NEEDED
Status: DISCONTINUED | OUTPATIENT
Start: 2024-06-28 | End: 2024-06-28

## 2024-06-28 RX ADMIN — SODIUM CHLORIDE, POTASSIUM CHLORIDE, SODIUM LACTATE AND CALCIUM CHLORIDE 50 ML/HR: 600; 310; 30; 20 INJECTION, SOLUTION INTRAVENOUS at 11:12

## 2024-06-28 RX ADMIN — ACETAMINOPHEN 975 MG: 325 TABLET ORAL at 11:12

## 2024-06-28 SDOH — HEALTH STABILITY: MENTAL HEALTH: CURRENT SMOKER: 0

## 2024-06-28 ASSESSMENT — PAIN - FUNCTIONAL ASSESSMENT
PAIN_FUNCTIONAL_ASSESSMENT: 0-10
PAIN_FUNCTIONAL_ASSESSMENT: 0-10

## 2024-06-28 ASSESSMENT — PAIN SCALES - GENERAL
PAINLEVEL_OUTOF10: 0 - NO PAIN
PAIN_LEVEL: 0
PAINLEVEL_OUTOF10: 6

## 2024-06-28 ASSESSMENT — COLUMBIA-SUICIDE SEVERITY RATING SCALE - C-SSRS
1. IN THE PAST MONTH, HAVE YOU WISHED YOU WERE DEAD OR WISHED YOU COULD GO TO SLEEP AND NOT WAKE UP?: NO
6. HAVE YOU EVER DONE ANYTHING, STARTED TO DO ANYTHING, OR PREPARED TO DO ANYTHING TO END YOUR LIFE?: NO
2. HAVE YOU ACTUALLY HAD ANY THOUGHTS OF KILLING YOURSELF?: NO

## 2024-06-28 NOTE — ANESTHESIA PREPROCEDURE EVALUATION
Patient: Alexander Marin    Procedure Information       Date/Time: 06/28/24 1330    Procedure: LEFT GREATER SAPHENOUS Vein Radio Frequency Ablation (Left) - 30 MINUTES PROCEDURE TIME    Location: [facility] / [facility]    Surgeons: Ander Main, DO            Relevant Problems   Anesthesia (within normal limits)      Pulmonary   (+) Asthma (HHS-HCC)   (+) Chronic obstructive pulmonary disease (Multi)   (+) Mild intermittent asthma without complication (HHS-HCC)      Neuro   (+) Depression, recurrent (CMS-HCC)      GI   (+) Chronic diarrhea      Endocrine   (+) Morbid obesity (Multi)      Hematology   (+) Anemia   (+) Coagulation defect, unspecified (Multi)      Musculoskeletal   (+) Arthritis or polyarthritis, rheumatoid (Multi)   (+) Chronic midline low back pain without sciatica   (+) Osteoarthritis of right knee   (+) Primary osteoarthritis of both knees   (+) Rheumatoid arthritis involving both hands with negative rheumatoid factor (Multi)   (+) Rheumatoid arthritis involving both knees (Multi)      HEENT   (+) Sinus congestion       Clinical information reviewed:   Tobacco  Allergies  Meds  Problems  Med Hx  Surg Hx   Fam Hx  Soc   Hx        NPO Detail:  NPO/Void Status  Date of Last Liquid: 06/27/24  Time of Last Liquid: 2200  Date of Last Solid: 06/27/24  Time of Last Solid: 2200         Physical Exam    Airway  Mallampati: I  TM distance: >3 FB  Neck ROM: full     Cardiovascular - normal exam  Rhythm: regular  Rate: normal     Dental    Pulmonary - normal exam  Breath sounds clear to auscultation     Abdominal   (+) obese  Abdomen: soft         Anesthesia Plan    History of general anesthesia?: yes  History of complications of general anesthesia?: no    ASA 3     MAC     The patient is not a current smoker.  Patient did not smoke on day of procedure.    intravenous induction   Anesthetic plan and risks discussed with patient.  Use of blood products discussed with patient who consented to  blood products.    Plan discussed with CRNA.

## 2024-06-28 NOTE — DISCHARGE INSTRUCTIONS
Leave ace wrap dressing in place 48 hours, May remove Sunday 6/30/24 morning. OK to remove groin dressing at that time. OK to shower at that time. Continue to wear ace wrap compression daily as needed for comfort until follow up visit.  Light activity until seen in office, ambulation is encouraged  Ambulate once per hour the day of surgery  Diet as tolerated  Call the office with questions or concerns     Your postoperative ultrasound is scheduled for 7/1/24  Your postoperative follow up visit is scheduled for 7/2/24

## 2024-06-28 NOTE — OP NOTE
LEFT GREATER SAPHENOUS Vein Radio Frequency Ablation (L) Operative Note     Date: 2024  OR Location: [facility]    Name: Alexander Marin, : 1975, Age: 48 y.o., MRN: [id number], Sex: female    Diagnosis  Pre-op Diagnosis     * Leg swelling [M79.89]     * Varicose veins of left lower extremity with pain [I83.812] Post-op Diagnosis     * Leg swelling [M79.89]     * Varicose veins of left lower extremity with pain [I83.812]     Procedures  LEFT GREATER SAPHENOUS Vein Radio Frequency Ablation  67071 - [place] ENDOVEN ABLTJ INCMPTNT VEIN XTR RF 1ST VEIN      Surgeons      * Ander Main - Primary    Resident/Fellow/Other Assistant:  Surgeons and Role:     * Miriam Washington PA-C - [facility] Assist    Procedure Summary  Anesthesia: Monitor Anesthesia Care  ASA: III  Anesthesia Staff: CRNA: TAMANNA Espino  Estimated Blood Loss: 5mL  Intra-op Medications:   Administrations occurring from 1140 to 1240 on 24:   Medication Name Total Dose   heparin 2,500 Units in sodium chloride 0.9 % 250 mL irrigation 250 mL   sodium chloride 0.9% 450 mL with lidocaine-epinephrine (Xylocaine W/EPI) 1 %-1:100,000 50 mL, sodium bicarbonate 5 mEq tumescent solution 300 mL              Anesthesia Record               Intraprocedure I/O Totals       None           Specimen: No specimens collected     Staff:   Circulator: Regla Workman Person: Deanne         Drains and/or Catheters: * None in log *    Tourniquet Times:         Implants:     Findings:      Indications: Alexander Marin is an 48 y.o. female who is having surgery for M79.89  I83.812. Symptomatic varicose veins left lower extremity    The patient was seen in the preoperative area. The risks, benefits, complications, treatment options, non-operative alternatives, expected recovery and outcomes were discussed with the patient. The possibilities of reaction to medication, pulmonary aspiration, injury to surrounding structures, bleeding, recurrent infection, the  need for additional procedures, failure to diagnose a condition, and creating a complication requiring transfusion or operation were discussed with the patient. The patient concurred with the proposed plan, giving informed consent.  The site of surgery was properly noted/marked if necessary per policy. The patient has been actively warmed in preoperative area. Preoperative antibiotics are not indicated. Venous thrombosis prophylaxis are not indicated.    Procedure Details: Patient brought to the OR suite placed in the supine position administered monitored anesthesia care.  left lower extremity sterilely prepped and draped standard fashion.  Ultrasound guidance was utilized to access the left greater saphenous vein at the knee advancing with no difficulty.  Dilator and sheath were advanced.  The venous closure device was advanced to the saphenofemoral junction withdrawn 3 cm confirmed transverse and longitudinal planes.  I Tumesced the greater saphenous vein in standard solution.  Venous closure device was engaged for 3 cycles 14 cm total treatment length.  Patient tolerated procedure well subsequently catheter and sheath were removed compressive wrap was placed patient was woken sent to PACU in stable condition.   Complications:  None; patient tolerated the procedure well.    Disposition: PACU - hemodynamically stable.  Condition: stable         Additional Details: No qualified vascular fellow was available for assistance in the above-noted procedure.  TOMI Belle was available for the entirety of the procedure.  She assisted in all components of the procedure from start to completion.     Attending Attestation: I was present and scrubbed for the entire procedure.    Ander Main  Phone Number: [phone]

## 2024-06-28 NOTE — ANESTHESIA POSTPROCEDURE EVALUATION
Patient: Alexander Marin    Procedure Summary       Date: 06/28/24 Room / Location: [facility] / [facility]    Anesthesia Start: 1129 Anesthesia Stop: 1207    Procedure: LEFT GREATER SAPHENOUS Vein Radio Frequency Ablation (Left) Diagnosis:       Leg swelling      Varicose veins of left lower extremity with pain      (M79.89)      (I83.812)    Surgeons: Ander Main DO Responsible Provider: TAMANNA Espino    Anesthesia Type: MAC ASA Status: 3            Anesthesia Type: MAC    Vitals Value Taken Time   /78 06/28/24 1211   Temp 36.7 °C (98 °F) 06/28/24 1205   Pulse 77 06/28/24 1214   Resp 10 06/28/24 1214   SpO2 91 % 06/28/24 1214   Vitals shown include unfiled device data.    Anesthesia Post Evaluation    Patient location during evaluation: PACU  Patient participation: complete - patient participated  Level of consciousness: awake and alert  Pain score: 0  Pain management: adequate  Airway patency: patent  Cardiovascular status: acceptable and hemodynamically stable  Respiratory status: acceptable and room air  Hydration status: acceptable  Postoperative Nausea and Vomiting: none    There were no known notable events for this encounter.

## 2024-07-01 ENCOUNTER — HOSPITAL ENCOUNTER (OUTPATIENT)
Dept: VASCULAR MEDICINE | Facility: HOSPITAL | Age: 49
Discharge: HOME | End: 2024-07-01
Payer: MEDICARE

## 2024-07-01 DIAGNOSIS — M79.89 LEG SWELLING: ICD-10-CM

## 2024-07-01 PROCEDURE — 93971 EXTREMITY STUDY: CPT | Performed by: INTERNAL MEDICINE

## 2024-07-01 PROCEDURE — 93971 EXTREMITY STUDY: CPT

## 2024-07-02 ENCOUNTER — APPOINTMENT (OUTPATIENT)
Dept: VASCULAR SURGERY | Facility: CLINIC | Age: 49
End: 2024-07-02
Payer: MEDICARE

## 2024-07-02 VITALS
WEIGHT: 251 LBS | DIASTOLIC BLOOD PRESSURE: 88 MMHG | HEART RATE: 98 BPM | BODY MASS INDEX: 46.66 KG/M2 | SYSTOLIC BLOOD PRESSURE: 127 MMHG

## 2024-07-02 DIAGNOSIS — I83.812 VARICOSE VEINS OF LEFT LOWER EXTREMITY WITH PAIN: Primary | ICD-10-CM

## 2024-07-02 PROCEDURE — 1036F TOBACCO NON-USER: CPT | Performed by: SURGERY

## 2024-07-02 PROCEDURE — 99212 OFFICE O/P EST SF 10 MIN: CPT | Performed by: SURGERY

## 2024-07-02 PROCEDURE — 3008F BODY MASS INDEX DOCD: CPT | Performed by: SURGERY

## 2024-07-02 NOTE — PROGRESS NOTES
Subjective   Patient ID: Alexander Marin is a 48 y.o. female who presents for Follow-up (Lt GSV RFA).  HPI  Patient is overall doing well  Left lower extremity stable some soreness along the course of the GSV ablation however no open sores noted  Otherwise active and amatory    Review of Systems    Objective   Physical Exam  Sites clean dry and intact  No evidence of erythema  No edema noted    Assessment/Plan   Status post endovenous ablation left lower extremity  Continue compression elevation activity  Follow-up as needed  Recent venous duplex demonstrates no evidence of DVT successful ablation noted.           Ander Mani DO 07/02/24 2:34 PM

## 2024-07-17 ENCOUNTER — CLINICAL SUPPORT (OUTPATIENT)
Dept: NUTRITION | Facility: HOSPITAL | Age: 49
End: 2024-07-17
Payer: MEDICARE

## 2024-07-17 NOTE — PROGRESS NOTES
[employer] [facility] [employer]  Diet Recommendation 1: Diabetes  Diet Recommendation 2: Calories, Low  Household Size: 7 Members (4 Max/Household)  Interventions: Referral Number: 2nd 6 Mo Referral 1 yr (Referrals may not be consecutive)  Interventions: Visit Number: 6 of 6 Visits - Max 6 Visits/Referral Each 6 Mo Period  Grains: 0-25% Whole  Fruit: 50-75% Fresh  Vegetables: 50-75% Fresh  Proteins: 1-2 Plant-based Items  Dairy: 25-50% Lowfat  Initials of RD Assisting Today: SB

## 2024-08-15 ENCOUNTER — CLINICAL SUPPORT (OUTPATIENT)
Dept: NUTRITION | Facility: HOSPITAL | Age: 49
End: 2024-08-15
Payer: MEDICARE

## 2024-08-15 NOTE — PROGRESS NOTES
[facility] [employer]  Diet Recommendation 1: Diabetes  Diet Recommendation 2: Calories, Low  Diet Recommendation 3: Sodium, Low  Nutrition Goals Stated: Working on changing eating habits for the whole family  Household Size: 7 Members (4 Max/Household)  Interventions: Referral Number: 3rd 6 Mo Referral 1.5 yrs (Referrals may not be consecutive)  Interventions: Visit Number: 1 of 6 Visits - Max 6 Visits/Referral Each 6 Mo Period  Education Today: Healthy Recipes  Grains: 25-50% Whole  Fruit: Fresh - 100%  Vegetables: Fresh - 100%  Proteins: 0 Plant-based Items  Dairy: 0-25% Lowfat  Originating Site of Referral Order: Serge Marie MD  Initials of RD Assisting Today: SYMONE

## 2024-09-19 ENCOUNTER — CLINICAL SUPPORT (OUTPATIENT)
Dept: NUTRITION | Facility: HOSPITAL | Age: 49
End: 2024-09-19
Payer: MEDICARE

## 2024-09-19 NOTE — PROGRESS NOTES
[employer]  Diet Recommendation 1: Diabetes  Diet Recommendation 2: Calories, Low  Diet Recommendation 3: Sodium, Low  Nutrition Goals Stated: Working on changing eating habits for the whole family  Household Size: 7 Members (4 Max/Household)  Interventions: Referral Number: 3rd 6 Mo Referral 1.5 yrs (Referrals may not be consecutive)  Interventions: Visit Number: 2 of 6 Visits - Max 6 Visits/Referral Each 6 Mo Period  Education Today: Healthy Recipes  Grains: 25-50% Whole  Fruit: Fresh - 100%  Vegetables: Fresh - 100%  Proteins: 1-2 Plant-based Items  Dairy: 50-75% Lowfat  Originating Site of Referral Order: Serge Marie MD  Initials of RD Assisting Today: SYMONE

## 2024-10-02 ENCOUNTER — LAB (OUTPATIENT)
Dept: LAB | Facility: LAB | Age: 49
End: 2024-10-02
Payer: MEDICARE

## 2024-10-02 DIAGNOSIS — F33.9 DEPRESSION, RECURRENT (CMS-HCC): ICD-10-CM

## 2024-10-02 DIAGNOSIS — E55.9 VITAMIN D DEFICIENCY: ICD-10-CM

## 2024-10-02 DIAGNOSIS — R73.03 PREDIABETES: ICD-10-CM

## 2024-10-02 DIAGNOSIS — Z00.00 ROUTINE GENERAL MEDICAL EXAMINATION AT HEALTH CARE FACILITY: ICD-10-CM

## 2024-10-02 DIAGNOSIS — J44.9 CHRONIC OBSTRUCTIVE PULMONARY DISEASE, UNSPECIFIED COPD TYPE (MULTI): ICD-10-CM

## 2024-10-02 DIAGNOSIS — D68.9 COAGULATION DEFECT (MULTI): ICD-10-CM

## 2024-10-02 DIAGNOSIS — E74.89 OTHER SPECIFIED DISORDERS OF CARBOHYDRATE METABOLISM: ICD-10-CM

## 2024-10-02 DIAGNOSIS — M81.0 POSTMENOPAUSAL BONE LOSS: ICD-10-CM

## 2024-10-02 DIAGNOSIS — E66.01 OBESITY, CLASS III, BMI 40-49.9 (MORBID OBESITY) (MULTI): ICD-10-CM

## 2024-10-02 DIAGNOSIS — M06.9 RHEUMATOID ARTHRITIS, INVOLVING UNSPECIFIED SITE, UNSPECIFIED WHETHER RHEUMATOID FACTOR PRESENT (MULTI): ICD-10-CM

## 2024-10-02 DIAGNOSIS — E78.00 ELEVATED LOW DENSITY LIPOPROTEIN (LDL) CHOLESTEROL LEVEL: ICD-10-CM

## 2024-10-02 LAB
25(OH)D3 SERPL-MCNC: 20 NG/ML (ref 30–100)
BASOPHILS # BLD AUTO: 0.03 X10*3/UL (ref 0–0.1)
BASOPHILS NFR BLD AUTO: 0.4 %
CHOLEST SERPL-MCNC: 162 MG/DL (ref 0–199)
CHOLESTEROL/HDL RATIO: 3.8
CREAT UR-MCNC: 135.2 MG/DL (ref 20–320)
EOSINOPHIL # BLD AUTO: 0.17 X10*3/UL (ref 0–0.7)
EOSINOPHIL NFR BLD AUTO: 2.1 %
ERYTHROCYTE [DISTWIDTH] IN BLOOD BY AUTOMATED COUNT: 14.3 % (ref 11.5–14.5)
EST. AVERAGE GLUCOSE BLD GHB EST-MCNC: 134 MG/DL
HBA1C MFR BLD: 6.3 %
HCT VFR BLD AUTO: 36.7 % (ref 36–46)
HCV AB SER QL: NONREACTIVE
HDLC SERPL-MCNC: 42.3 MG/DL
HGB BLD-MCNC: 11.9 G/DL (ref 12–16)
HIV 1+2 AB+HIV1 P24 AG SERPL QL IA: NONREACTIVE
IMM GRANULOCYTES # BLD AUTO: 0.03 X10*3/UL (ref 0–0.7)
IMM GRANULOCYTES NFR BLD AUTO: 0.4 % (ref 0–0.9)
LDLC SERPL CALC-MCNC: 101 MG/DL
LYMPHOCYTES # BLD AUTO: 2.44 X10*3/UL (ref 1.2–4.8)
LYMPHOCYTES NFR BLD AUTO: 30.8 %
MCH RBC QN AUTO: 28.4 PG (ref 26–34)
MCHC RBC AUTO-ENTMCNC: 32.4 G/DL (ref 32–36)
MCV RBC AUTO: 88 FL (ref 80–100)
MICROALBUMIN UR-MCNC: 24.8 MG/L
MICROALBUMIN/CREAT UR: 18.3 UG/MG CREAT
MONOCYTES # BLD AUTO: 0.33 X10*3/UL (ref 0.1–1)
MONOCYTES NFR BLD AUTO: 4.2 %
NEUTROPHILS # BLD AUTO: 4.92 X10*3/UL (ref 1.2–7.7)
NEUTROPHILS NFR BLD AUTO: 62.1 %
NON HDL CHOLESTEROL: 120 MG/DL (ref 0–149)
NRBC BLD-RTO: 0 /100 WBCS (ref 0–0)
PLATELET # BLD AUTO: 334 X10*3/UL (ref 150–450)
RBC # BLD AUTO: 4.19 X10*6/UL (ref 4–5.2)
TRIGL SERPL-MCNC: 95 MG/DL (ref 0–149)
VLDL: 19 MG/DL (ref 0–40)
WBC # BLD AUTO: 7.9 X10*3/UL (ref 4.4–11.3)

## 2024-10-02 PROCEDURE — 82043 UR ALBUMIN QUANTITATIVE: CPT

## 2024-10-02 PROCEDURE — 80061 LIPID PANEL: CPT

## 2024-10-02 PROCEDURE — 83036 HEMOGLOBIN GLYCOSYLATED A1C: CPT

## 2024-10-02 PROCEDURE — 36415 COLL VENOUS BLD VENIPUNCTURE: CPT

## 2024-10-02 PROCEDURE — 86803 HEPATITIS C AB TEST: CPT

## 2024-10-02 PROCEDURE — 87389 HIV-1 AG W/HIV-1&-2 AB AG IA: CPT

## 2024-10-02 PROCEDURE — 85025 COMPLETE CBC W/AUTO DIFF WBC: CPT

## 2024-10-02 PROCEDURE — 82570 ASSAY OF URINE CREATININE: CPT

## 2024-10-02 PROCEDURE — 82306 VITAMIN D 25 HYDROXY: CPT

## 2024-10-03 ENCOUNTER — APPOINTMENT (OUTPATIENT)
Dept: PRIMARY CARE | Facility: CLINIC | Age: 49
End: 2024-10-03
Payer: MEDICARE

## 2024-10-03 ENCOUNTER — TELEPHONE (OUTPATIENT)
Dept: PRIMARY CARE | Facility: CLINIC | Age: 49
End: 2024-10-03

## 2024-10-03 VITALS
TEMPERATURE: 97.2 F | HEART RATE: 115 BPM | DIASTOLIC BLOOD PRESSURE: 75 MMHG | SYSTOLIC BLOOD PRESSURE: 117 MMHG | RESPIRATION RATE: 16 BRPM | BODY MASS INDEX: 46.19 KG/M2 | WEIGHT: 251 LBS | HEIGHT: 62 IN | OXYGEN SATURATION: 99 %

## 2024-10-03 DIAGNOSIS — D64.9 ANEMIA, UNSPECIFIED TYPE: ICD-10-CM

## 2024-10-03 DIAGNOSIS — D12.6 ADENOMATOUS POLYP OF COLON, UNSPECIFIED PART OF COLON: ICD-10-CM

## 2024-10-03 DIAGNOSIS — J45.909 MILD ASTHMA WITHOUT COMPLICATION, UNSPECIFIED WHETHER PERSISTENT (HHS-HCC): ICD-10-CM

## 2024-10-03 DIAGNOSIS — E55.9 VITAMIN D DEFICIENCY: ICD-10-CM

## 2024-10-03 DIAGNOSIS — Z12.11 COLON CANCER SCREENING: ICD-10-CM

## 2024-10-03 DIAGNOSIS — F33.9 DEPRESSION, RECURRENT (CMS-HCC): ICD-10-CM

## 2024-10-03 DIAGNOSIS — E53.8 VITAMIN B12 DEFICIENCY: Primary | ICD-10-CM

## 2024-10-03 DIAGNOSIS — R73.03 PREDIABETES: ICD-10-CM

## 2024-10-03 DIAGNOSIS — M06.9 RHEUMATOID ARTHRITIS, INVOLVING UNSPECIFIED SITE, UNSPECIFIED WHETHER RHEUMATOID FACTOR PRESENT (MULTI): ICD-10-CM

## 2024-10-03 DIAGNOSIS — E66.01 MORBID OBESITY DUE TO EXCESS CALORIES (MULTI): ICD-10-CM

## 2024-10-03 PROCEDURE — 99214 OFFICE O/P EST MOD 30 MIN: CPT | Performed by: INTERNAL MEDICINE

## 2024-10-03 PROCEDURE — 3008F BODY MASS INDEX DOCD: CPT | Performed by: INTERNAL MEDICINE

## 2024-10-03 PROCEDURE — 1036F TOBACCO NON-USER: CPT | Performed by: INTERNAL MEDICINE

## 2024-10-03 RX ORDER — ALBUTEROL SULFATE 90 UG/1
1 INHALANT RESPIRATORY (INHALATION) EVERY 4 HOURS PRN
COMMUNITY
Start: 2024-07-23

## 2024-10-03 RX ORDER — FOLIC ACID 1 MG/1
1 TABLET ORAL DAILY
Qty: 90 TABLET | Refills: 1 | Status: SHIPPED | OUTPATIENT
Start: 2024-10-03

## 2024-10-03 RX ORDER — ACETAMINOPHEN, DEXTROMETHORPHAN HBR, DOXYLAMINE SUCCINATE, PHENYLEPHRINE HCL 650; 20; 12.5; 1 MG/30ML; MG/30ML; MG/30ML; MG/30ML
1 SOLUTION ORAL DAILY
Qty: 90 TABLET | Refills: 1 | Status: SHIPPED | OUTPATIENT
Start: 2024-10-03

## 2024-10-03 RX ORDER — ACETAMINOPHEN 500 MG
100 TABLET ORAL DAILY
Qty: 200 CAPSULE | Refills: 2 | Status: SHIPPED | OUTPATIENT
Start: 2024-10-03

## 2024-10-03 RX ORDER — MELOXICAM 15 MG/1
15 TABLET ORAL DAILY
COMMUNITY
Start: 2024-09-28

## 2024-10-03 RX ORDER — FLUTICASONE FUROATE AND VILANTEROL 100; 25 UG/1; UG/1
1 POWDER RESPIRATORY (INHALATION) DAILY
Qty: 60 EACH | Refills: 3 | Status: SHIPPED | OUTPATIENT
Start: 2024-10-03

## 2024-10-03 RX ORDER — FERROUS GLUCONATE 325 MG
38 TABLET ORAL EVERY OTHER DAY
Qty: 45 TABLET | Refills: 1 | Status: SHIPPED | OUTPATIENT
Start: 2024-10-03 | End: 2025-10-03

## 2024-10-03 SDOH — ECONOMIC STABILITY: FOOD INSECURITY: WITHIN THE PAST 12 MONTHS, YOU WORRIED THAT YOUR FOOD WOULD RUN OUT BEFORE YOU GOT MONEY TO BUY MORE.: OFTEN TRUE

## 2024-10-03 SDOH — ECONOMIC STABILITY: FOOD INSECURITY: WITHIN THE PAST 12 MONTHS, THE FOOD YOU BOUGHT JUST DIDN'T LAST AND YOU DIDN'T HAVE MONEY TO GET MORE.: OFTEN TRUE

## 2024-10-03 ASSESSMENT — LIFESTYLE VARIABLES
AUDIT-C TOTAL SCORE: 0
HOW OFTEN DO YOU HAVE SIX OR MORE DRINKS ON ONE OCCASION: NEVER
SKIP TO QUESTIONS 9-10: 1
HOW OFTEN DO YOU HAVE A DRINK CONTAINING ALCOHOL: NEVER
HOW MANY STANDARD DRINKS CONTAINING ALCOHOL DO YOU HAVE ON A TYPICAL DAY: PATIENT DOES NOT DRINK

## 2024-10-03 ASSESSMENT — PATIENT HEALTH QUESTIONNAIRE - PHQ9
1. LITTLE INTEREST OR PLEASURE IN DOING THINGS: NOT AT ALL
2. FEELING DOWN, DEPRESSED OR HOPELESS: NOT AT ALL
SUM OF ALL RESPONSES TO PHQ9 QUESTIONS 1 & 2: 0

## 2024-10-03 NOTE — PROGRESS NOTES
"Chief Complaint/HPI:    Follow up:    left greater saphenous vein radiofrequency ablation w/ Dr. Main on 6/28/24, patient has had resolution of varicosity and pain now     Mammogram 1/2024 -- negative  Colonoscopy 10/2021 with three year clearance. Due and would like it ordered.     Elevated LDL: decreased from 111 to 101. She has made dietary changes-- including adding sea fregoso and other healthy foods    Right knee pain: Following with Dr. Harmon. Has had multiple injections. Plan to do arthroplasty after patient's BMI <40. However, they are hesitant due to patient's age. Taking Tylenol for pain. She has seen pain management. She has been through PT, which did help. She does home exercises that she learned at PT.       Obesity/weight loss: has been trying to lose weight. She states that this is the most she has ever weighed. Patient was given a prescription for Wegovy, however it was \"on back order\" and she has not received it. She does follow with a nutritionist, follow up in January. The patient does walk for 30 minutes daily, though swimming seems to do better for her knees. No longer drinking sodas or eating fried foods.  Patient is on food for life program. Patient has not been able to get Mounjaro. She has been trying to lose weight. GLP 1 agonists are apparently not covered except if the patient has DM     Prediabetes: Hgb A1c was 6.3. Denies any polyuria, polydipsia, polyphagia, vision changes or neuropathy , she has been unable to get a GLP 1 agonist for treatment    Anemia: Hemoglobin 11.9 -- patient states it runs in her family. She is willing to try iron pills.      RA/ Sarcoidosis arthritis: patient is taking Plaquenil per Dr Lundy now, is taking folate also.     Asthma: Currently uses her albuterol inhaler as needed. States it has been getting worse. She is interested in Breo Ellipta.      Depression/insomnia: she is going to [facility], sees Alyssa Avitia. Currently on Cymbalta and trazadone. Her " mood has been stable. She is worried about her health. States that the Cymbalta does help with her pain.      Vitamin D deficiency: patient takes vitamin d on/off. Vitamin D level low at 20.        ROS otherwise negative aside from what was mentioned above in HPI.      Patient Active Problem List   Diagnosis    Acute medial meniscal tear    Adenomatous polyp of colon    Anemia    Arthritis or polyarthritis, rheumatoid    Asthma    Bilateral knee pain    Bruise    Bursitis of right knee    Calcaneal spur of left foot    Chronic diarrhea    Chronic midline low back pain without sciatica    Decreased range of motion of right knee    Depression, recurrent (CMS-HCC)    Diverticulosis of colon    Gait abnormality    Impaired strength of lower extremity    Knee injury    Numbness and tingling    Obesity, Class III, BMI 40-49.9 (morbid obesity) (Multi)    Patellar tendinitis of right knee    Polyphagia    Rheumatoid arthritis involving both knees (Multi)    Right upper quadrant abdominal tenderness    Sinus congestion    Tenderness of left calf    Vitamin B12 deficiency    Vitamin D deficiency    Weakness of both lower extremities    Achilles tendinitis of both lower extremities    Discoloration of nailbeds    Mild intermittent asthma without complication (HHS-HCC)    Rheumatoid arthritis involving both hands with negative rheumatoid factor (Multi)    Primary osteoarthritis of both knees    Obesity (BMI 35.0-39.9 without comorbidity)    BMI 45.0-49.9, adult (Multi)    Chronic foot pain, left    Decreased range of motion of both knees    Inappropriate diet and eating habits    Truncal muscle weakness    Routine general medical examination at health care facility    Prediabetes    Coagulation defect, unspecified    Ankle swelling    Calcaneal spur    Chronic obstructive pulmonary disease (Multi)    History of obesity    Knee pain    Morbid obesity (Multi)    Osteoarthritis of right knee    Other specified disorders of  carbohydrate metabolism    Skin lesion         Past Medical History:   Diagnosis Date    Acute medial meniscal tear 04/26/2023    Adenomatous polyp of colon 04/26/2023    Anemia 04/26/2023    Arthritis or polyarthritis, rheumatoid 04/26/2023    Asthma 04/26/2023    Bilateral knee pain 04/26/2023    Bruise 04/26/2023    Bursitis of right knee 04/26/2023    Calcaneal spur of left foot 04/26/2023    Chronic diarrhea 04/26/2023    Chronic midline low back pain without sciatica 04/26/2023    Decreased range of motion of right knee 04/26/2023    Depression 04/26/2023    Diverticulosis of colon 04/26/2023    Gait abnormality 04/26/2023    Impaired strength of lower extremity 04/26/2023    Knee injury 04/26/2023    Numbness and tingling 04/26/2023    Obesity, Class III, BMI 40-49.9 (morbid obesity) (Multi) 04/26/2023    Patellar tendinitis of right knee 04/26/2023    Polyphagia 04/26/2023    Rheumatoid arthritis     Rheumatoid arthritis involving both knees (Multi) 04/26/2023    Right upper quadrant abdominal tenderness 04/26/2023    Sinus congestion 04/26/2023    Tenderness of left calf 04/26/2023    Vitamin B12 deficiency 04/26/2023    Vitamin D deficiency 04/26/2023    Weakness of both lower extremities 04/26/2023     Past Surgical History:   Procedure Laterality Date    CARPAL TUNNEL RELEASE Bilateral     CHOLECYSTECTOMY  09/23/2016    Cholecystectomy    DILATION AND CURETTAGE OF UTERUS  09/23/2016    Dilation And Curettage    OTHER SURGICAL HISTORY  09/23/2016    Ear Surgery    OTHER SURGICAL HISTORY  09/23/2016    Oral Surgery    OTHER SURGICAL HISTORY  06/25/2020    Knee arthroscopy    VENOUS ABLATION Left 06/28/2024     Social History     Social History Narrative    Not on file         ALLERGIES  Bee venom protein (honey bee), Fish containing products, Naproxen, Sulfamethoxazole-trimethoprim, Watermelon, Meperidine, and Promethazine      MEDICATIONS  Current Outpatient Medications on File Prior to Visit   Medication  "Sig Dispense Refill    albuterol 90 mcg/actuation inhaler 1 puff every 4 hours if needed for wheezing or shortness of breath.      DULoxetine (Cymbalta) 30 mg DR capsule Take 1 capsule (30 mg) by mouth once daily. Do not crush or chew.      DULoxetine (Cymbalta) 60 mg DR capsule Take 1 capsule (60 mg) by mouth once daily. Unknown of up dosage      hydroxychloroquine (Plaquenil) 200 mg tablet Take 1 tablet (200 mg) by mouth 2 times a day.      meloxicam (Mobic) 15 mg tablet Take 1 tablet (15 mg) by mouth once daily.      traZODone (Desyrel) 100 mg tablet TAKE 1/2 (ONE-HALF) TO 1 (ONE) TABLET BY MOUTH FOR HEADACHE AS NEEDED for sleep      turmeric root extract 500 mg tablet Take by mouth.      [DISCONTINUED] cholecalciferol (Vitamin D-3) 25 MCG (1000 UT) capsule Take 1 capsule (25 mcg) by mouth once daily. 90 capsule 1    [DISCONTINUED] cyanocobalamin, vitamin B-12, (Vitamin B-12) 1,000 mcg tablet extended release Take 1 tablet (1,000 mcg) by mouth once daily. 90 tablet 1    [DISCONTINUED] folic acid (Folvite) 1 mg tablet Take 1 tablet (1 mg) by mouth once daily. 90 tablet 1     No current facility-administered medications on file prior to visit.         PHYSICAL EXAM  /75 (BP Location: Right arm, Patient Position: Sitting, BP Cuff Size: Large adult)   Pulse (!) 115   Temp 36.2 °C (97.2 °F)   Resp 16   Ht 1.562 m (5' 1.5\")   Wt 114 kg (251 lb)   SpO2 99%   BMI 46.66 kg/m²   Body mass index is 46.66 kg/m².    CONSTITUTIONAL - well nourished, well developed, looks like stated age, in no acute distress, not ill-appearing, and not tired appearing, she is morbidly obese  SKIN - normal skin color and pigmentation, normal skin turgor without rash, lesions, or nodules visualized on exposed skin  HEAD - no trauma, normocephalic  EYES - extraocular muscles are intact, and normal external exam  Neck - supple, normal auricles, no thyroid masses , no neck masses noted  CHEST - clear to auscultation, no wheezing, no " crackles and no rales, good effort  CARDIAC - regular rate and regular rhythm, no skipped beats, no murmur, no carotid bruits noted,  ABDOMEN - obese  EXTREMITIES - no edema, no deformities  PSYCHIATRIC - alert, pleasant and cordial, age-appropriate  IMMUNOLOGIC - no cervical lymphadenopathy        ASSESSMENT/PLAN  Problem List Items Addressed This Visit       Adenomatous polyp of colon    Current Assessment & Plan     Follow up for colon cancer screening         Relevant Orders    Colonoscopy Screening; High Risk Patient    CBC and Auto Differential    Comprehensive Metabolic Panel    Anemia    Current Assessment & Plan     Will try iron supplementation, she has a family history, continue to monitor labs         Relevant Medications    ferrous gluconate (Fergon) 324 (38 Fe) mg tablet    Other Relevant Orders    CBC and Auto Differential    Comprehensive Metabolic Panel    Iron and TIBC    Arthritis or polyarthritis, rheumatoid    Current Assessment & Plan     Continue to follow with Dr. Lundy, no med changes         Relevant Orders    CBC and Auto Differential    Comprehensive Metabolic Panel    Asthma    Current Assessment & Plan     Patient uses albuterol, would like to try Breo, it is ordered for chronic use today, follow up as scheduled         Relevant Medications    fluticasone furoate-vilanteroL (Breo Ellipta) 100-25 mcg/dose inhaler    Other Relevant Orders    CBC and Auto Differential    Comprehensive Metabolic Panel    BMI 45.0-49.9, adult (Multi)    Relevant Orders    Lipid Panel    Insulin, random    Depression, recurrent (CMS-HCC)    Current Assessment & Plan     Continue current regimen and recommendations of psychiatry         Relevant Orders    CBC and Auto Differential    Comprehensive Metabolic Panel    Prediabetes    Current Assessment & Plan     Continue to modify diet and exercise, she has not been able to get coverage for GLP 1 agonist, check an insulin level with next labs, check labs as  ordered          Relevant Orders    Albumin-Creatinine Ratio, Urine Random    CBC and Auto Differential    Comprehensive Metabolic Panel    Hemoglobin A1C    Lipid Panel    Insulin, random    Vitamin B12 deficiency - Primary    Current Assessment & Plan     Continue supplementation         Relevant Medications    cyanocobalamin, vitamin B-12, (Vitamin B-12) 1,000 mcg tablet extended release    folic acid (Folvite) 1 mg tablet    Other Relevant Orders    CBC and Auto Differential    Comprehensive Metabolic Panel    Vitamin B12    Vitamin D deficiency    Current Assessment & Plan     Continue supplementation         Relevant Medications    cholecalciferol (Vitamin D-3) 50 mcg (2,000 unit) capsule    cyanocobalamin, vitamin B-12, (Vitamin B-12) 1,000 mcg tablet extended release    folic acid (Folvite) 1 mg tablet    Other Relevant Orders    CBC and Auto Differential    Comprehensive Metabolic Panel    Vitamin D 25-Hydroxy,Total (for eval of Vitamin D levels)     Other Visit Diagnoses       Colon cancer screening        Relevant Orders    Colonoscopy Screening; High Risk Patient    CBC and Auto Differential    Comprehensive Metabolic Panel    Morbid obesity due to excess calories (Multi)        Relevant Orders    Lipid Panel          Complete blood work as ordered, will check insulin level also. Follow up in 6 months. Patient would not like flu vaccine at today's visit. Complete colonoscopy , history of adenomatous colon polyp    Serge Marie MD

## 2024-10-03 NOTE — ASSESSMENT & PLAN NOTE
Continue to modify diet and exercise, she has not been able to get coverage for GLP 1 agonist, check an insulin level with next labs, check labs as ordered

## 2024-10-03 NOTE — ASSESSMENT & PLAN NOTE
Patient uses albuterol, would like to try Breo, it is ordered for chronic use today, follow up as scheduled

## 2024-10-07 ENCOUNTER — TELEPHONE (OUTPATIENT)
Dept: GASTROENTEROLOGY | Facility: CLINIC | Age: 49
End: 2024-10-07
Payer: MEDICARE

## 2024-10-07 NOTE — TELEPHONE ENCOUNTER
----- Message from Arya LEGER sent at 10/7/2024  9:52 AM EDT -----  Regarding: RE: OFFICE VISIT  Patient scheduled for 11/19/24 with Eunice Zhang  ----- Message -----  From: Bonnie Parrish RN  Sent: 10/4/2024  12:59 PM EDT  To:  [id number] Clerical  Subject: OFFICE VISIT                                     OFFICE VISIT GI

## 2024-10-23 ENCOUNTER — CLINICAL SUPPORT (OUTPATIENT)
Dept: NUTRITION | Facility: HOSPITAL | Age: 49
End: 2024-10-23
Payer: MEDICARE

## 2024-10-23 NOTE — PROGRESS NOTES
Food For Life  Diet Recommendation 1: Diabetes  Diet Recommendation 2: Calories, Low  Diet Recommendation 3: Sodium, Low  Other Diet Recommendations: Cut out pop; pt loves to ontiveros healthy meals for her family. Chooses a great variety of fresh fruits and veggies.  Nutrition Goals Stated: Working on changing eating habits for the whole family  Household Size: 7 Members (4 Max/Household)  Interventions: Referral Number: 3rd 6 Mo Referral 1.5 yrs (Referrals may not be consecutive)  Interventions: Visit Number: 3 of 6 Visits - Max 6 Visits/Referral Each 6 Mo Period  Education Today: Healthy Recipes  Grains: 0-25% Whole  Fruit: Fresh - 100%  Vegetables: Fresh - 100%  Proteins: 1-2 Plant-based Items  Dairy: Lowfat - 100%  Originating Site of Referral Order: Serge Marie MD  Initials of RD Assisting Today: SYMONE

## 2024-10-24 ENCOUNTER — APPOINTMENT (OUTPATIENT)
Dept: OBSTETRICS AND GYNECOLOGY | Facility: CLINIC | Age: 49
End: 2024-10-24
Payer: MEDICARE

## 2024-10-24 VITALS
DIASTOLIC BLOOD PRESSURE: 80 MMHG | BODY MASS INDEX: 46.01 KG/M2 | SYSTOLIC BLOOD PRESSURE: 142 MMHG | HEIGHT: 62 IN | WEIGHT: 250 LBS

## 2024-10-24 DIAGNOSIS — Z01.419 WOMEN'S ANNUAL ROUTINE GYNECOLOGICAL EXAMINATION: Primary | ICD-10-CM

## 2024-10-24 DIAGNOSIS — Z12.31 SCREENING MAMMOGRAM FOR BREAST CANCER: ICD-10-CM

## 2024-10-24 PROCEDURE — 3008F BODY MASS INDEX DOCD: CPT | Performed by: NURSE PRACTITIONER

## 2024-10-24 PROCEDURE — 99396 PREV VISIT EST AGE 40-64: CPT | Performed by: NURSE PRACTITIONER

## 2024-10-24 PROCEDURE — 1036F TOBACCO NON-USER: CPT | Performed by: NURSE PRACTITIONER

## 2024-10-24 ASSESSMENT — ENCOUNTER SYMPTOMS
CARDIOVASCULAR NEGATIVE: 1
ENDOCRINE NEGATIVE: 1
EYES NEGATIVE: 1
GASTROINTESTINAL NEGATIVE: 1
RESPIRATORY NEGATIVE: 1
PSYCHIATRIC NEGATIVE: 1
CONSTITUTIONAL NEGATIVE: 1
MUSCULOSKELETAL NEGATIVE: 1
NEUROLOGICAL NEGATIVE: 1

## 2024-10-24 NOTE — PROGRESS NOTES
Subjective   Patient ID: Alexander Marin is a 49 y.o. female who presents for Annual Exam (Mammogram done 1/12/2024/Last Annual 10/23/2023/PAP /HPV 10/19/2022).  49 year old here for annual exam without complaints.  She is due for her pap in 2025 as her last pap was normal with neg hpv in 2022.  She is due for her mammogram in 2025.  She denies any bleeding, pain, discharge, urinary changes and breast complaints.         Review of Systems   Constitutional: Negative.    HENT: Negative.     Eyes: Negative.    Respiratory: Negative.     Cardiovascular: Negative.    Gastrointestinal: Negative.    Endocrine: Negative.    Genitourinary: Negative.    Musculoskeletal: Negative.    Skin: Negative.    Neurological: Negative.    Psychiatric/Behavioral: Negative.         Objective   Physical Exam  Vitals reviewed.   Constitutional:       Appearance: Normal appearance. She is well-developed.   Pulmonary:      Effort: Pulmonary effort is normal. No respiratory distress.   Chest:   Breasts:     Breasts are symmetrical.      Right: Normal. No swelling, bleeding, inverted nipple, mass, nipple discharge, skin change or tenderness.      Left: Normal. No swelling, bleeding, inverted nipple, mass, nipple discharge, skin change or tenderness.   Abdominal:      Palpations: Abdomen is soft.   Genitourinary:     General: Normal vulva.      Exam position: Lithotomy position.      Pubic Area: No rash.       Labia:         Right: No rash, tenderness, lesion or injury.         Left: No rash, tenderness, lesion or injury.       Urethra: No prolapse, urethral pain, urethral swelling or urethral lesion.      Vagina: Normal.      Cervix: Normal.      Uterus: Normal.       Adnexa: Right adnexa normal and left adnexa normal.      Rectum: Normal.   Musculoskeletal:         General: Normal range of motion.   Lymphadenopathy:      Upper Body:      Right upper body: No supraclavicular, axillary or pectoral adenopathy.      Left upper body: No  supraclavicular, axillary or pectoral adenopathy.   Skin:     General: Skin is warm and dry.   Neurological:      General: No focal deficit present.      Mental Status: She is alert and oriented to person, place, and time. Mental status is at baseline.   Psychiatric:         Attention and Perception: Attention and perception normal.         Mood and Affect: Mood and affect normal.         Speech: Speech normal.         Behavior: Behavior normal. Behavior is cooperative.         Thought Content: Thought content normal.         Judgment: Judgment normal.         Assessment/Plan   Problem List Items Addressed This Visit             ICD-10-CM    Women's annual routine gynecological examination - Primary Z01.419     Other Visit Diagnoses         Codes    Screening mammogram for breast cancer     Z12.31    Relevant Orders    BI mammo bilateral screening tomosynthesis          Pap due 2025  Mammogram ordered   Follow up 1 year or as needed       SKINNY Rodriguez 10/24/24 10:29 AM

## 2024-11-19 ENCOUNTER — APPOINTMENT (OUTPATIENT)
Dept: GASTROENTEROLOGY | Facility: CLINIC | Age: 49
End: 2024-11-19
Payer: MEDICARE

## 2024-11-19 VITALS
HEART RATE: 96 BPM | BODY MASS INDEX: 45.82 KG/M2 | DIASTOLIC BLOOD PRESSURE: 74 MMHG | HEIGHT: 62 IN | SYSTOLIC BLOOD PRESSURE: 105 MMHG | WEIGHT: 249 LBS

## 2024-11-19 DIAGNOSIS — Z86.0101 HISTORY OF ADENOMATOUS POLYP OF COLON: Primary | ICD-10-CM

## 2024-11-19 PROCEDURE — 99204 OFFICE O/P NEW MOD 45 MIN: CPT | Performed by: NURSE PRACTITIONER

## 2024-11-19 PROCEDURE — 3008F BODY MASS INDEX DOCD: CPT | Performed by: NURSE PRACTITIONER

## 2024-11-19 RX ORDER — SOD SULF/POT CHLORIDE/MAG SULF 1.479 G
24 TABLET ORAL ONCE
Qty: 24 TABLET | Refills: 0 | Status: SHIPPED | OUTPATIENT
Start: 2024-11-19 | End: 2024-11-19

## 2024-11-19 ASSESSMENT — ENCOUNTER SYMPTOMS
ARTHRALGIAS: 0
DIZZINESS: 0
DIFFICULTY URINATING: 0
JOINT SWELLING: 0
TROUBLE SWALLOWING: 0
SHORTNESS OF BREATH: 0
SORE THROAT: 0
WEAKNESS: 0
FEVER: 0
CHILLS: 0
BRUISES/BLEEDS EASILY: 0
ROS GI COMMENTS: SEE HPI
CONFUSION: 0
COUGH: 0
PALPITATIONS: 0
ADENOPATHY: 0
WOUND: 0

## 2024-11-19 NOTE — PROGRESS NOTES
Subjective   Patient ID: Alexander Marin is a 49 y.o. female with PMH of asthma, depression, RA, anemia, colon polyps, and endovenous ablation of the LLE who was referred by No ref. provider found for New Patient Visit (OA fail for BMI - last colonoscopy done 10/8/21 with Dr. Fernandez).     Patient's PCP is Serge Marie MD     HPI  OA fail--BMI  BMI 46.3     Patient had a colonoscopy 10/2021 that removed 25mm polyp of the proximal transverse colon (TVA) and a 5mm polyp of the sigmoid colon (HP). It was recommended she repeat her colonoscopy in 3 years at that time.     She denies any GI symptoms. Patient denies any unintended weight loss, nausea, vomiting, dysphagia, reflux, abdominal pain, melena, hematemesis, diarrhea, constipation, or rectal bleeding.        Summary of endoscopies:  - Colonoscopy 10/2021: 25mm polyp proximal transverse colon (TVA) and 5mm polyp mid-sigmoid colon (HP). Prominent ileocecal valve was biopsied and negative for dysplasia and malignancy     Social Hx:  Tobacco: none   Etoh: none  Recreational drug use: none   NSAIDs: none      Family Hx:  Paternal uncle -- colon cancer age 70s   Paternal cousin -- colon cancer age 50s   No IBD or pancreatitis     Review of Systems:  Review of Systems   Constitutional:  Negative for chills and fever.   HENT:  Negative for sore throat and trouble swallowing.    Respiratory:  Negative for cough and shortness of breath.    Cardiovascular:  Negative for chest pain and palpitations.   Gastrointestinal:         SEE HPI   Endocrine: Negative for cold intolerance and heat intolerance.   Genitourinary:  Negative for difficulty urinating.   Musculoskeletal:  Negative for arthralgias and joint swelling.   Skin:  Negative for rash and wound.   Neurological:  Negative for dizziness and weakness.   Hematological:  Negative for adenopathy. Does not bruise/bleed easily.   Psychiatric/Behavioral:  Negative for confusion.         Medications:  Prior to  Admission medications    Medication Sig Start Date End Date Taking? Authorizing Provider   albuterol 90 mcg/actuation inhaler 1 puff every 4 hours if needed for wheezing or shortness of breath. 7/23/24   Historical Provider, MD   cholecalciferol (Vitamin D-3) 50 mcg (2,000 unit) capsule Take 2 capsules (100 mcg) by mouth once daily. 10/3/24   Serge Marie MD   cyanocobalamin, vitamin B-12, (Vitamin B-12) 1,000 mcg tablet extended release Take 1 tablet (1,000 mcg) by mouth once daily. 10/3/24   Serge Marie MD   DULoxetine (Cymbalta) 30 mg DR capsule Take 1 capsule (30 mg) by mouth once daily. Do not crush or chew.    Historical Provider, MD   DULoxetine (Cymbalta) 60 mg DR capsule Take 1 capsule (60 mg) by mouth once daily. Unknown of up dosage 3/20/23   Historical Provider, MD   ferrous gluconate (Fergon) 324 (38 Fe) mg tablet Take 1 tablet (38 mg of iron) by mouth every other day. 10/3/24 10/3/25  Serge Marie MD   fluticasone furoate-vilanteroL (Breo Ellipta) 100-25 mcg/dose inhaler Inhale 1 puff once daily. 10/3/24   Serge Marie MD   folic acid (Folvite) 1 mg tablet Take 1 tablet (1 mg) by mouth once daily. 10/3/24   Serge Marie MD   hydroxychloroquine (Plaquenil) 200 mg tablet Take 1 tablet (200 mg) by mouth 2 times a day. 2/8/23   Historical Provider, MD   meloxicam (Mobic) 15 mg tablet Take 1 tablet (15 mg) by mouth once daily. 9/28/24   Historical Provider, MD   sod sulf-pot chloride-mag sulf (Sutab) 1.479-0.188- 0.225 gram tablet Take 24 tablets by mouth 1 time for 1 dose. Use as directed for colonoscopy. 11/19/24 11/19/24  Constanza Zhang, APRN-CNP   traZODone (Desyrel) 100 mg tablet TAKE 1/2 (ONE-HALF) TO 1 (ONE) TABLET BY MOUTH FOR HEADACHE AS NEEDED for sleep 3/20/23   Historical Provider, MD   turmeric root extract 500 mg tablet Take by mouth.    Historical Provider, MD       Allergies:  Bee venom protein (honey bee), Fish containing products, Naproxen,  Sulfamethoxazole-trimethoprim, Watermelon, Meperidine, and Promethazine    Past Medical History:  She has a past medical history of Acute medial meniscal tear (04/26/2023), Adenomatous polyp of colon (04/26/2023), Anemia (04/26/2023), Arthritis or polyarthritis, rheumatoid (04/26/2023), Asthma (04/26/2023), Bilateral knee pain (04/26/2023), Bruise (04/26/2023), Bursitis of right knee (04/26/2023), Calcaneal spur of left foot (04/26/2023), Chronic diarrhea (04/26/2023), Chronic midline low back pain without sciatica (04/26/2023), Decreased range of motion of right knee (04/26/2023), Depression (04/26/2023), Diverticulosis of colon (04/26/2023), Gait abnormality (04/26/2023), Impaired strength of lower extremity (04/26/2023), Knee injury (04/26/2023), Numbness and tingling (04/26/2023), Obesity, Class III, BMI 40-49.9 (morbid obesity) (Multi) (04/26/2023), Patellar tendinitis of right knee (04/26/2023), Polyphagia (04/26/2023), Rheumatoid arthritis, Rheumatoid arthritis involving both knees (Multi) (04/26/2023), Right upper quadrant abdominal tenderness (04/26/2023), Sinus congestion (04/26/2023), Tenderness of left calf (04/26/2023), Vitamin B12 deficiency (04/26/2023), Vitamin D deficiency (04/26/2023), and Weakness of both lower extremities (04/26/2023).    Past Surgical History:  She has a past surgical history that includes Dilation and curettage of uterus (09/23/2016); Cholecystectomy (09/23/2016); Other surgical history (09/23/2016); Other surgical history (09/23/2016); Other surgical history (06/25/2020); Carpal tunnel release (Bilateral); and Venous ablation (Left, 06/28/2024).    Social History:  She reports that she has never smoked. She has never used smokeless tobacco. She reports that she does not drink alcohol and does not use drugs.    Objective   Physical exam:  Physical Exam  Constitutional:       General: She is not in acute distress.     Appearance: Normal appearance.   HENT:      Mouth/Throat:       Mouth: Mucous membranes are moist.      Comments: pink  Eyes:      Conjunctiva/sclera: Conjunctivae normal.      Pupils: Pupils are equal, round, and reactive to light.   Cardiovascular:      Rate and Rhythm: Normal rate and regular rhythm.      Heart sounds: No murmur heard.  Pulmonary:      Effort: Pulmonary effort is normal.      Breath sounds: Normal breath sounds.   Abdominal:      General: Bowel sounds are normal. There is no distension.      Palpations: Abdomen is soft.      Tenderness: There is no abdominal tenderness. There is no guarding.   Skin:     General: Skin is warm and dry.      Coloration: Skin is not jaundiced.   Neurological:      Mental Status: She is alert and oriented to person, place, and time.   Psychiatric:         Mood and Affect: Mood normal.         Behavior: Behavior normal.          Assessment/Plan     History of TVA colon polyp  Will schedule for colonoscopy in OR due to BMI. Pt requests sutab; have sent to pharmacy.    No meds to hold.          Constanza Zhang, APRN-CNP

## 2024-11-19 NOTE — PATIENT INSTRUCTIONS
Thank you for coming to your appointment today   - You will be scheduled for a colonoscopy in the OR   - Please follow the bowel prep instructions given to you by the office.   - After your procedure, you can expect to speak to the physician to go over the initial results of the procedure.   - If any polyps are removed during your procedure or if any biopsies are obtained those specimens will go to the pathologists to review under the microscope. Once those results are available they will be sent to the physician electronically to review. These results will also be available to you at that time through the patient portal. These results will be reviewed by the physician and communicated back to you with final recommendations. If you have questions or need additional information I urge you to call the office at [phone], but we do ask for patience as the we are often with patients.   - You were also given information regarding the schedule for your procedure including the time that you need to arrive to the endoscopy unit.  You will also be contacted about 1 week prior to your procedure to confirm the final arrival time.  If you have questions about this or if you need to cancel or change this appointment please call my office at [phone].      Please call [phone] with any questions or concerns

## 2024-11-26 ENCOUNTER — CLINICAL SUPPORT (OUTPATIENT)
Dept: NUTRITION | Facility: HOSPITAL | Age: 49
End: 2024-11-26
Payer: MEDICARE

## 2024-11-26 NOTE — PROGRESS NOTES
[facility]  Diet Recommendation 1: Diabetes  Diet Recommendation 2: Calories, Low  Diet Recommendation 3: Sodium, Low  Other Diet Recommendations: Cut out pop; pt loves to ontiveros healthy meals for her family. Chooses a great variety of fresh fruits and veggies.  Nutrition Goals Stated: Working on changing eating habits for the whole family. She is also currently onweight loss journey and has been feeling much better!  Household Size: 7 Members (4 Max/Household)  Interventions: Referral Number: 3rd 6 Mo Referral 1.5 yrs (Referrals may not be consecutive)  Interventions: Visit Number: 4 of 6 Visits - Max 6 Visits/Referral Each 6 Mo Period  Education Today: Healthy Recipes  Recipes Today: Roasted [place] Squash  Grains: 0-25% Whole  Fruit: 50-75% Fresh  Vegetables: 50-75% Fresh  Proteins: 1-2 Plant-based Items  Relevant [facility] Inpatient Discharge Items: Needs a new referral for next appointment  Originating Site of Referral Order: Serge Marie MD  Initials of RD Assisting Today:

## 2024-12-13 ENCOUNTER — ANESTHESIA EVENT (OUTPATIENT)
Dept: OPERATING ROOM | Facility: HOSPITAL | Age: 49
End: 2024-12-13
Payer: MEDICARE

## 2024-12-15 ENCOUNTER — TELEPHONE (OUTPATIENT)
Dept: GASTROENTEROLOGY | Facility: CLINIC | Age: 49
End: 2024-12-15
Payer: MEDICARE

## 2024-12-15 DIAGNOSIS — Z86.0101 HISTORY OF ADENOMATOUS POLYP OF COLON: Primary | ICD-10-CM

## 2024-12-15 RX ORDER — POLYETHYLENE GLYCOL 3350, SODIUM SULFATE ANHYDROUS, SODIUM BICARBONATE, SODIUM CHLORIDE, POTASSIUM CHLORIDE 236; 22.74; 6.74; 5.86; 2.97 G/4L; G/4L; G/4L; G/4L; G/4L
4000 POWDER, FOR SOLUTION ORAL ONCE
Qty: 4000 ML | Refills: 0 | Status: SHIPPED | OUTPATIENT
Start: 2024-12-15 | End: 2024-12-16

## 2024-12-16 ENCOUNTER — HOSPITAL ENCOUNTER (OUTPATIENT)
Dept: OPERATING ROOM | Facility: HOSPITAL | Age: 49
Discharge: HOME | End: 2024-12-16
Payer: MEDICARE

## 2024-12-16 ENCOUNTER — ANESTHESIA (OUTPATIENT)
Dept: OPERATING ROOM | Facility: HOSPITAL | Age: 49
End: 2024-12-16
Payer: MEDICARE

## 2024-12-16 VITALS
HEIGHT: 62 IN | SYSTOLIC BLOOD PRESSURE: 131 MMHG | DIASTOLIC BLOOD PRESSURE: 90 MMHG | OXYGEN SATURATION: 100 % | HEART RATE: 79 BPM | WEIGHT: 249 LBS | BODY MASS INDEX: 45.82 KG/M2 | TEMPERATURE: 98.4 F | RESPIRATION RATE: 13 BRPM

## 2024-12-16 DIAGNOSIS — J45.909 UNSPECIFIED ASTHMA, UNCOMPLICATED (HHS-HCC): ICD-10-CM

## 2024-12-16 DIAGNOSIS — Z86.0101 HISTORY OF ADENOMATOUS POLYP OF COLON: ICD-10-CM

## 2024-12-16 LAB
ANION GAP SERPL CALC-SCNC: 13 MMOL/L (ref 10–20)
BUN SERPL-MCNC: 10 MG/DL (ref 6–23)
CALCIUM SERPL-MCNC: 9 MG/DL (ref 8.6–10.3)
CHLORIDE SERPL-SCNC: 103 MMOL/L (ref 98–107)
CO2 SERPL-SCNC: 25 MMOL/L (ref 21–32)
CREAT SERPL-MCNC: 0.79 MG/DL (ref 0.5–1.05)
EGFRCR SERPLBLD CKD-EPI 2021: >90 ML/MIN/1.73M*2
ERYTHROCYTE [DISTWIDTH] IN BLOOD BY AUTOMATED COUNT: 13.9 % (ref 11.5–14.5)
GLUCOSE SERPL-MCNC: 96 MG/DL (ref 74–99)
HCT VFR BLD AUTO: 38.5 % (ref 36–46)
HGB BLD-MCNC: 12.1 G/DL (ref 12–16)
MCH RBC QN AUTO: 26.7 PG (ref 26–34)
MCHC RBC AUTO-ENTMCNC: 31.4 G/DL (ref 32–36)
MCV RBC AUTO: 85 FL (ref 80–100)
NRBC BLD-RTO: 0 /100 WBCS (ref 0–0)
PLATELET # BLD AUTO: 324 X10*3/UL (ref 150–450)
POTASSIUM SERPL-SCNC: 3.7 MMOL/L (ref 3.5–5.3)
PREGNANCY TEST URINE, POC: NEGATIVE
RBC # BLD AUTO: 4.54 X10*6/UL (ref 4–5.2)
SODIUM SERPL-SCNC: 137 MMOL/L (ref 136–145)
WBC # BLD AUTO: 7.5 X10*3/UL (ref 4.4–11.3)

## 2024-12-16 PROCEDURE — 80048 BASIC METABOLIC PNL TOTAL CA: CPT | Performed by: ANESTHESIOLOGY

## 2024-12-16 PROCEDURE — 2500000001 HC RX 250 WO HCPCS SELF ADMINISTERED DRUGS (ALT 637 FOR MEDICARE OP): Performed by: ANESTHESIOLOGY

## 2024-12-16 PROCEDURE — 3600000007 HC OR TIME - EACH INCREMENTAL 1 MINUTE - PROCEDURE LEVEL TWO: Performed by: NURSE ANESTHETIST, CERTIFIED REGISTERED

## 2024-12-16 PROCEDURE — 3600000002 HC OR TIME - INITIAL BASE CHARGE - PROCEDURE LEVEL TWO: Performed by: NURSE ANESTHETIST, CERTIFIED REGISTERED

## 2024-12-16 PROCEDURE — G0105 COLORECTAL SCRN; HI RISK IND: HCPCS | Performed by: INTERNAL MEDICINE

## 2024-12-16 PROCEDURE — 85027 COMPLETE CBC AUTOMATED: CPT | Performed by: ANESTHESIOLOGY

## 2024-12-16 PROCEDURE — 81025 URINE PREGNANCY TEST: CPT | Performed by: ANESTHESIOLOGY

## 2024-12-16 PROCEDURE — 2500000004 HC RX 250 GENERAL PHARMACY W/ HCPCS (ALT 636 FOR OP/ED): Performed by: NURSE ANESTHETIST, CERTIFIED REGISTERED

## 2024-12-16 PROCEDURE — 3700000002 HC GENERAL ANESTHESIA TIME - EACH INCREMENTAL 1 MINUTE: Performed by: NURSE ANESTHETIST, CERTIFIED REGISTERED

## 2024-12-16 PROCEDURE — 3700000001 HC GENERAL ANESTHESIA TIME - INITIAL BASE CHARGE: Performed by: NURSE ANESTHETIST, CERTIFIED REGISTERED

## 2024-12-16 PROCEDURE — 7100000010 HC PHASE TWO TIME - EACH INCREMENTAL 1 MINUTE: Performed by: NURSE ANESTHETIST, CERTIFIED REGISTERED

## 2024-12-16 PROCEDURE — 36415 COLL VENOUS BLD VENIPUNCTURE: CPT | Performed by: ANESTHESIOLOGY

## 2024-12-16 PROCEDURE — 2500000004 HC RX 250 GENERAL PHARMACY W/ HCPCS (ALT 636 FOR OP/ED): Performed by: ANESTHESIOLOGY

## 2024-12-16 PROCEDURE — 7100000009 HC PHASE TWO TIME - INITIAL BASE CHARGE: Performed by: NURSE ANESTHETIST, CERTIFIED REGISTERED

## 2024-12-16 RX ORDER — FAMOTIDINE 10 MG/ML
20 INJECTION INTRAVENOUS ONCE
Status: COMPLETED | OUTPATIENT
Start: 2024-12-16 | End: 2024-12-16

## 2024-12-16 RX ORDER — ALBUTEROL SULFATE 90 UG/1
1 INHALANT RESPIRATORY (INHALATION) EVERY 4 HOURS PRN
Qty: 8.5 G | Refills: 5 | Status: SHIPPED | OUTPATIENT
Start: 2024-12-16

## 2024-12-16 RX ORDER — SODIUM CHLORIDE, SODIUM LACTATE, POTASSIUM CHLORIDE, CALCIUM CHLORIDE 600; 310; 30; 20 MG/100ML; MG/100ML; MG/100ML; MG/100ML
20 INJECTION, SOLUTION INTRAVENOUS CONTINUOUS
Status: DISCONTINUED | OUTPATIENT
Start: 2024-12-16 | End: 2024-12-17 | Stop reason: HOSPADM

## 2024-12-16 RX ORDER — ALBUTEROL SULFATE 0.83 MG/ML
2.5 SOLUTION RESPIRATORY (INHALATION) ONCE
Status: DISCONTINUED | OUTPATIENT
Start: 2024-12-16 | End: 2024-12-16 | Stop reason: HOSPADM

## 2024-12-16 RX ORDER — METOCLOPRAMIDE HYDROCHLORIDE 5 MG/ML
10 INJECTION INTRAMUSCULAR; INTRAVENOUS ONCE
Status: COMPLETED | OUTPATIENT
Start: 2024-12-16 | End: 2024-12-16

## 2024-12-16 RX ORDER — SODIUM CITRATE AND CITRIC ACID MONOHYDRATE 334; 500 MG/5ML; MG/5ML
30 SOLUTION ORAL ONCE
Status: COMPLETED | OUTPATIENT
Start: 2024-12-16 | End: 2024-12-16

## 2024-12-16 RX ORDER — PROPOFOL 10 MG/ML
INJECTION, EMULSION INTRAVENOUS AS NEEDED
Status: DISCONTINUED | OUTPATIENT
Start: 2024-12-16 | End: 2024-12-16

## 2024-12-16 SDOH — HEALTH STABILITY: MENTAL HEALTH: CURRENT SMOKER: 0

## 2024-12-16 ASSESSMENT — PAIN SCALES - GENERAL
PAINLEVEL_OUTOF10: 0 - NO PAIN
PAIN_LEVEL: 0
PAINLEVEL_OUTOF10: 0 - NO PAIN

## 2024-12-16 ASSESSMENT — COLUMBIA-SUICIDE SEVERITY RATING SCALE - C-SSRS
2. HAVE YOU ACTUALLY HAD ANY THOUGHTS OF KILLING YOURSELF?: NO
6. HAVE YOU EVER DONE ANYTHING, STARTED TO DO ANYTHING, OR PREPARED TO DO ANYTHING TO END YOUR LIFE?: NO
1. IN THE PAST MONTH, HAVE YOU WISHED YOU WERE DEAD OR WISHED YOU COULD GO TO SLEEP AND NOT WAKE UP?: NO

## 2024-12-16 ASSESSMENT — PAIN - FUNCTIONAL ASSESSMENT
PAIN_FUNCTIONAL_ASSESSMENT: 0-10
PAIN_FUNCTIONAL_ASSESSMENT: 0-10

## 2024-12-16 NOTE — ANESTHESIA POSTPROCEDURE EVALUATION
Patient: Alexander Marin    Procedure Summary       Date: 12/16/24 Room / Location: [facility] OR    Anesthesia Start: 0950 Anesthesia Stop: 1015    Procedure: COLONOSCOPY Diagnosis: History of adenomatous polyp of colon    Scheduled Providers: Anderson Gonzales DO; Amy Minor MA Responsible Provider: TAMANNA Whyte    Anesthesia Type: MAC ASA Status: 2            Anesthesia Type: MAC    Vitals Value Taken Time   /84 12/16/24 1042   Temp 36.9 °C (98.4 °F) 12/16/24 1008   Pulse 80 12/16/24 1026   Resp 12 12/16/24 1026   SpO2 100 % 12/16/24 1045   Vitals shown include unfiled device data.    Anesthesia Post Evaluation    Patient location during evaluation: PACU  Patient participation: complete - patient participated  Level of consciousness: awake and alert  Pain score: 0  Pain management: adequate  Airway patency: patent  Cardiovascular status: hemodynamically stable  Respiratory status: room air  Hydration status: acceptable  Postoperative Nausea and Vomiting: none    No notable events documented.

## 2024-12-17 ASSESSMENT — PAIN SCALES - GENERAL: PAINLEVEL_OUTOF10: 0 - NO PAIN

## 2025-01-07 ENCOUNTER — CLINICAL SUPPORT (OUTPATIENT)
Dept: NUTRITION | Facility: HOSPITAL | Age: 50
End: 2025-01-07
Payer: MEDICARE

## 2025-01-07 NOTE — PROGRESS NOTES
[facility]  Diet Recommendation 1: Heart Healthy  Diet Recommendation 2: Calories, Low  Food Intolerance Avoidance: No fish  Nutrition Goals Stated: Wt loss  Household Size: 8 Members (4 Max/Household)  Interventions: Referral Number: 5th 6 Mo Referral 2.5 yrs (Referrals may not be consecutive)  Interventions: Visit Number: 5 of 6 Visits - Max 6 Visits/Referral Each 6 Mo Period  Education Today: Healthy Recipes  Recipes Today: Anton salad meal prep; medit tuna salad  Grains: 50-75% Whole  Fruit: % Fresh  Vegetables: % Fresh  Proteins: 1-2 Plant-based Items  Dairy: 0-25% Lowfat  Relevant [facility] Inpatient Discharge Items: No dairy available  Initials of RD Assisting Today: FRANCY

## 2025-01-15 DIAGNOSIS — J45.909 MILD ASTHMA WITHOUT COMPLICATION, UNSPECIFIED WHETHER PERSISTENT (HHS-HCC): ICD-10-CM

## 2025-01-15 RX ORDER — FLUTICASONE FUROATE AND VILANTEROL TRIFENATATE 100; 25 UG/1; UG/1
POWDER RESPIRATORY (INHALATION)
Qty: 60 EACH | Refills: 3 | Status: SHIPPED | OUTPATIENT
Start: 2025-01-15

## 2025-02-18 ENCOUNTER — TELEPHONE (OUTPATIENT)
Dept: PRIMARY CARE | Facility: CLINIC | Age: 50
End: 2025-02-18

## 2025-02-18 ENCOUNTER — CLINICAL SUPPORT (OUTPATIENT)
Dept: NUTRITION | Facility: HOSPITAL | Age: 50
End: 2025-02-18
Payer: MEDICARE

## 2025-02-18 DIAGNOSIS — E53.8 VITAMIN B12 DEFICIENCY: ICD-10-CM

## 2025-02-18 DIAGNOSIS — Z72.4 INAPPROPRIATE DIET AND EATING HABITS: ICD-10-CM

## 2025-02-18 DIAGNOSIS — E66.01 MORBID OBESITY (MULTI): ICD-10-CM

## 2025-02-18 DIAGNOSIS — E55.9 VITAMIN D DEFICIENCY: ICD-10-CM

## 2025-02-18 NOTE — PROGRESS NOTES
"[facility]  Diet Recommendation 1: Heart Healthy  Diet Recommendation 2: Healthy Eating  Other Diet Recommendations: Hx of prediabetes  Food Intolerance Avoidance: Allergic to fresh fish but can tolerated canned per pt  Household Size: 7 Members (4 Max/Household)  Interventions: Referral Number: 5th 6 Mo Referral 2.5 yrs (Referrals may not be consecutive)  Interventions: Visit Number: 6 of 6 Visits - Max 6 Visits/Referral Each 6 Mo Period  Education Today: My[employer] Meals  Follow Up Notes for Future Visits: Pt is cook of the HH- mentioned today that she enjoys feeding her family differently than how she ate growing up. For the most part, they aren't too picky! Pt follows low cho diet- got lettuce and cucumbers today to use as \"bread\" for her sandwiches. (Kids do not follow this diet). Pt aware she needs new referral for next month  Grains: 50-75% Whole  Fruit: 50-75% Fresh  Vegetables: Fresh - 100%  Proteins: 0 Plant-based Items  Dairy: 50-75% Lowfat  Originating Site of Referral Order: Serge Marie MD  Initials of RD Assisting Today: SPRING   "

## 2025-03-26 ENCOUNTER — CLINICAL SUPPORT (OUTPATIENT)
Dept: NUTRITION | Facility: HOSPITAL | Age: 50
End: 2025-03-26
Payer: MEDICARE

## 2025-03-26 NOTE — PROGRESS NOTES
[facility]  Diet Recommendation 1: Heart Healthy  Diet Recommendation 2: Healthy Eating  Other Diet Recommendations: Hx of prediabetes  Food Intolerance Avoidance: Allergic to fresh fish but can tolerate canned per pt  Nutrition Goals Stated: Wt loss  Household Size: 7 Members (4 Max/Household)  Interventions: Referral Number: 6th 6 Mo Referral 3 yrs (Referrals may not be consecutive)  Interventions: Visit Number: 1 of 6 Visits - Max 6 Visits/Referral Each 6 Mo Period  Education Today: MyPlate Meals  Follow Up Notes for Future Visits: Pt getting blood drawn today to check A1C, has MD appt upcoming. Pt feeling pretty confident. She has really enjoyed making healthier eating choices lately!  Grains: 25-50% Whole  Fruit: % Fresh  Vegetables: % Fresh  Proteins: 3 Plant-based Items  Dairy: 0-25% Lowfat  Originating Site of Referral Order: Serge Marie MD  Initials of RD Assisting Today: SPRING

## 2025-03-28 LAB
25(OH)D3+25(OH)D2 SERPL-MCNC: 23 NG/ML (ref 30–100)
ALBUMIN SERPL-MCNC: 4.3 G/DL (ref 3.6–5.1)
ALBUMIN/CREAT UR: 16 MG/G CREAT
ALP SERPL-CCNC: 83 U/L (ref 37–153)
ALT SERPL-CCNC: 12 U/L (ref 6–29)
ANION GAP SERPL CALCULATED.4IONS-SCNC: 9 MMOL/L (CALC) (ref 7–17)
AST SERPL-CCNC: 13 U/L (ref 10–35)
BASOPHILS # BLD AUTO: 53 CELLS/UL (ref 0–200)
BASOPHILS NFR BLD AUTO: 0.7 %
BILIRUB SERPL-MCNC: 0.4 MG/DL (ref 0.2–1.2)
BUN SERPL-MCNC: 13 MG/DL (ref 7–25)
CALCIUM SERPL-MCNC: 9.1 MG/DL (ref 8.6–10.4)
CHLORIDE SERPL-SCNC: 102 MMOL/L (ref 98–110)
CHOLEST SERPL-MCNC: 186 MG/DL
CHOLEST/HDLC SERPL: 3.6 (CALC)
CO2 SERPL-SCNC: 28 MMOL/L (ref 20–32)
CREAT SERPL-MCNC: 0.71 MG/DL (ref 0.5–1.03)
CREAT UR-MCNC: 139 MG/DL (ref 20–275)
EGFRCR SERPLBLD CKD-EPI 2021: 103 ML/MIN/1.73M2
EOSINOPHIL # BLD AUTO: 190 CELLS/UL (ref 15–500)
EOSINOPHIL NFR BLD AUTO: 2.5 %
ERYTHROCYTE [DISTWIDTH] IN BLOOD BY AUTOMATED COUNT: 13.9 % (ref 11–15)
EST. AVERAGE GLUCOSE BLD GHB EST-MCNC: 137 MG/DL
EST. AVERAGE GLUCOSE BLD GHB EST-SCNC: 7.6 MMOL/L
GLUCOSE SERPL-MCNC: 95 MG/DL (ref 65–99)
HBA1C MFR BLD: 6.4 % OF TOTAL HGB
HCT VFR BLD AUTO: 38.6 % (ref 35–45)
HDLC SERPL-MCNC: 51 MG/DL
HGB BLD-MCNC: 12.4 G/DL (ref 11.7–15.5)
INSULIN SERPL-ACNC: 12.1 UIU/ML
IRON SATN MFR SERPL: 33 % (CALC) (ref 16–45)
IRON SERPL-MCNC: 99 MCG/DL (ref 45–160)
LDLC SERPL CALC-MCNC: 116 MG/DL (CALC)
LYMPHOCYTES # BLD AUTO: 2645 CELLS/UL (ref 850–3900)
LYMPHOCYTES NFR BLD AUTO: 34.8 %
MCH RBC QN AUTO: 27.7 PG (ref 27–33)
MCHC RBC AUTO-ENTMCNC: 32.1 G/DL (ref 32–36)
MCV RBC AUTO: 86.2 FL (ref 80–100)
MICROALBUMIN UR-MCNC: 2.2 MG/DL
MONOCYTES # BLD AUTO: 410 CELLS/UL (ref 200–950)
MONOCYTES NFR BLD AUTO: 5.4 %
NEUTROPHILS # BLD AUTO: 4302 CELLS/UL (ref 1500–7800)
NEUTROPHILS NFR BLD AUTO: 56.6 %
NONHDLC SERPL-MCNC: 135 MG/DL (CALC)
PLATELET # BLD AUTO: 375 THOUSAND/UL (ref 140–400)
PMV BLD REES-ECKER: 10.1 FL (ref 7.5–12.5)
POTASSIUM SERPL-SCNC: 3.9 MMOL/L (ref 3.5–5.3)
PROT SERPL-MCNC: 7.5 G/DL (ref 6.1–8.1)
RBC # BLD AUTO: 4.48 MILLION/UL (ref 3.8–5.1)
SODIUM SERPL-SCNC: 139 MMOL/L (ref 135–146)
TIBC SERPL-MCNC: 298 MCG/DL (CALC) (ref 250–450)
TRIGL SERPL-MCNC: 90 MG/DL
VIT B12 SERPL-MCNC: 449 PG/ML (ref 200–1100)
WBC # BLD AUTO: 7.6 THOUSAND/UL (ref 3.8–10.8)

## 2025-04-03 ENCOUNTER — APPOINTMENT (OUTPATIENT)
Dept: PRIMARY CARE | Facility: CLINIC | Age: 50
End: 2025-04-03
Payer: MEDICARE

## 2025-04-03 VITALS
OXYGEN SATURATION: 98 % | WEIGHT: 244 LBS | HEIGHT: 62 IN | RESPIRATION RATE: 16 BRPM | BODY MASS INDEX: 44.9 KG/M2 | HEART RATE: 74 BPM | SYSTOLIC BLOOD PRESSURE: 106 MMHG | TEMPERATURE: 97.4 F | DIASTOLIC BLOOD PRESSURE: 74 MMHG

## 2025-04-03 DIAGNOSIS — J45.909 MILD ASTHMA WITHOUT COMPLICATION, UNSPECIFIED WHETHER PERSISTENT (HHS-HCC): ICD-10-CM

## 2025-04-03 DIAGNOSIS — R73.03 PREDIABETES: ICD-10-CM

## 2025-04-03 DIAGNOSIS — E66.01 OBESITY, CLASS III, BMI 40-49.9 (MORBID OBESITY) (MULTI): ICD-10-CM

## 2025-04-03 DIAGNOSIS — M06.041 RHEUMATOID ARTHRITIS INVOLVING BOTH HANDS WITH NEGATIVE RHEUMATOID FACTOR (MULTI): ICD-10-CM

## 2025-04-03 DIAGNOSIS — J44.9 CHRONIC OBSTRUCTIVE PULMONARY DISEASE, UNSPECIFIED COPD TYPE (MULTI): ICD-10-CM

## 2025-04-03 DIAGNOSIS — E55.9 VITAMIN D DEFICIENCY: ICD-10-CM

## 2025-04-03 DIAGNOSIS — F33.1 MAJOR DEPRESSIVE DISORDER, RECURRENT, MODERATE: ICD-10-CM

## 2025-04-03 DIAGNOSIS — D64.9 ANEMIA, UNSPECIFIED TYPE: ICD-10-CM

## 2025-04-03 DIAGNOSIS — M06.042 RHEUMATOID ARTHRITIS INVOLVING BOTH HANDS WITH NEGATIVE RHEUMATOID FACTOR (MULTI): ICD-10-CM

## 2025-04-03 DIAGNOSIS — L98.8 SKIN LESION OF BREAST: ICD-10-CM

## 2025-04-03 DIAGNOSIS — M17.11 OSTEOARTHRITIS OF RIGHT KNEE, UNSPECIFIED OSTEOARTHRITIS TYPE: ICD-10-CM

## 2025-04-03 DIAGNOSIS — E53.8 VITAMIN B12 DEFICIENCY: ICD-10-CM

## 2025-04-03 DIAGNOSIS — Z00.00 ROUTINE GENERAL MEDICAL EXAMINATION AT HEALTH CARE FACILITY: Primary | ICD-10-CM

## 2025-04-03 RX ORDER — FERROUS GLUCONATE 325 MG
38 TABLET ORAL EVERY OTHER DAY
Qty: 45 TABLET | Refills: 1 | Status: SHIPPED | OUTPATIENT
Start: 2025-04-03 | End: 2026-04-03

## 2025-04-03 RX ORDER — ACETAMINOPHEN 500 MG
4000 TABLET ORAL DAILY
Qty: 180 CAPSULE | Refills: 1 | Status: SHIPPED | OUTPATIENT
Start: 2025-04-03

## 2025-04-03 RX ORDER — FOLIC ACID 1 MG/1
1 TABLET ORAL DAILY
Qty: 90 TABLET | Refills: 1 | Status: SHIPPED | OUTPATIENT
Start: 2025-04-03

## 2025-04-03 RX ORDER — VIT C/E/ZN/COPPR/LUTEIN/ZEAXAN 250MG-90MG
1 CAPSULE ORAL DAILY
Qty: 90 TABLET | Refills: 1 | Status: SHIPPED | OUTPATIENT
Start: 2025-04-03

## 2025-04-03 RX ORDER — ACETAMINOPHEN 500 MG
2000 TABLET ORAL DAILY
Qty: 90 CAPSULE | Refills: 1 | Status: SHIPPED | OUTPATIENT
Start: 2025-04-03 | End: 2025-04-03

## 2025-04-03 SDOH — ECONOMIC STABILITY: FOOD INSECURITY: WITHIN THE PAST 12 MONTHS, YOU WORRIED THAT YOUR FOOD WOULD RUN OUT BEFORE YOU GOT MONEY TO BUY MORE.: OFTEN TRUE

## 2025-04-03 SDOH — ECONOMIC STABILITY: FOOD INSECURITY: WITHIN THE PAST 12 MONTHS, THE FOOD YOU BOUGHT JUST DIDN'T LAST AND YOU DIDN'T HAVE MONEY TO GET MORE.: OFTEN TRUE

## 2025-04-03 ASSESSMENT — ENCOUNTER SYMPTOMS
OCCASIONAL FEELINGS OF UNSTEADINESS: 0
LOSS OF SENSATION IN FEET: 0
DEPRESSION: 0

## 2025-04-03 ASSESSMENT — LIFESTYLE VARIABLES
SKIP TO QUESTIONS 9-10: 1
HOW MANY STANDARD DRINKS CONTAINING ALCOHOL DO YOU HAVE ON A TYPICAL DAY: PATIENT DOES NOT DRINK
HOW OFTEN DO YOU HAVE SIX OR MORE DRINKS ON ONE OCCASION: NEVER
HOW OFTEN DO YOU HAVE A DRINK CONTAINING ALCOHOL: NEVER
AUDIT-C TOTAL SCORE: 0

## 2025-04-03 ASSESSMENT — PATIENT HEALTH QUESTIONNAIRE - PHQ9
2. FEELING DOWN, DEPRESSED OR HOPELESS: NOT AT ALL
SUM OF ALL RESPONSES TO PHQ9 QUESTIONS 1 & 2: 0
1. LITTLE INTEREST OR PLEASURE IN DOING THINGS: NOT AT ALL

## 2025-04-03 ASSESSMENT — ACTIVITIES OF DAILY LIVING (ADL)
DOING_HOUSEWORK: INDEPENDENT
DRESSING: INDEPENDENT
TAKING_MEDICATION: INDEPENDENT
GROCERY_SHOPPING: INDEPENDENT
BATHING: INDEPENDENT
MANAGING_FINANCES: INDEPENDENT

## 2025-04-03 NOTE — ASSESSMENT & PLAN NOTE
Increase the dose of vitamin d as ordered  Orders:    cyanocobalamin, vitamin B-12, (Vitamin B-12) 1,000 mcg tablet extended release; Take 1 tablet (1,000 mcg) by mouth once daily.    folic acid (Folvite) 1 mg tablet; Take 1 tablet (1 mg) by mouth once daily.    CBC and Auto Differential; Future    Comprehensive Metabolic Panel; Future    Vitamin D 25-Hydroxy,Total (for eval of Vitamin D levels); Future    cholecalciferol (Vitamin D-3) 50 mcg (2,000 unit) capsule; Take 2 capsules (100 mcg) by mouth once daily.

## 2025-04-03 NOTE — ASSESSMENT & PLAN NOTE
Encourage weight loss, she has tried multiple diets, she has now stopped pop consumption  Orders:    CBC and Auto Differential; Future    Comprehensive Metabolic Panel; Future    Lipid Panel; Future    Referral to Clinical Pharmacy; Future

## 2025-04-03 NOTE — ASSESSMENT & PLAN NOTE
HgbA1C is 6.4, patient does need to lose weight, refer to clinical pharmacy to evaluate options at this time  Orders:    Albumin-Creatinine Ratio, Urine Random; Future    CBC and Auto Differential; Future    Comprehensive Metabolic Panel; Future    Hemoglobin A1C; Future    Lipid Panel; Future    Referral to Clinical Pharmacy; Future

## 2025-04-03 NOTE — ASSESSMENT & PLAN NOTE
She is stable  Orders:    CBC and Auto Differential; Future    Comprehensive Metabolic Panel; Future

## 2025-04-03 NOTE — ASSESSMENT & PLAN NOTE
stable  Orders:    ferrous gluconate (Fergon) 324 (38 Fe) mg tablet; Take 1 tablet (38 mg of iron) by mouth every other day.    CBC and Auto Differential; Future    Comprehensive Metabolic Panel; Future

## 2025-04-03 NOTE — ASSESSMENT & PLAN NOTE
Continue current inhaler therapies  Medicare wellness exam completed, recheck labs as ordered, talk to clinical pharmacy

## 2025-04-03 NOTE — ASSESSMENT & PLAN NOTE
Medicare wellness exam completed  Orders:    1 Year Follow Up In Primary Care - Wellness Exam; Future    CBC and Auto Differential; Future    Comprehensive Metabolic Panel; Future

## 2025-04-03 NOTE — ASSESSMENT & PLAN NOTE
Encourage exercise therapy, will encourage weight loss also  Orders:    CBC and Auto Differential; Future    Comprehensive Metabolic Panel; Future

## 2025-04-03 NOTE — ASSESSMENT & PLAN NOTE
She is stable, continue Breo  Orders:    CBC and Auto Differential; Future    Comprehensive Metabolic Panel; Future

## 2025-04-03 NOTE — ASSESSMENT & PLAN NOTE
stable  Orders:    cyanocobalamin, vitamin B-12, (Vitamin B-12) 1,000 mcg tablet extended release; Take 1 tablet (1,000 mcg) by mouth once daily.    folic acid (Folvite) 1 mg tablet; Take 1 tablet (1 mg) by mouth once daily.    CBC and Auto Differential; Future    Comprehensive Metabolic Panel; Future    Vitamin B12; Future

## 2025-04-03 NOTE — ASSESSMENT & PLAN NOTE
On Plaquenil, sees rheumatology  Orders:    CBC and Auto Differential; Future    Comprehensive Metabolic Panel; Future

## 2025-04-03 NOTE — PROGRESS NOTES
Subjective   Reason for Visit: Alexander Marin is an 49 y.o. female here for a Medicare Wellness visit.     Past Medical, Surgical, and Family History reviewed and updated in chart.    Reviewed all medications by prescribing practitioner or clinical pharmacist (such as prescriptions, OTCs, herbal therapies and supplements) and documented in the medical record.    HPI    Chief Complaint/HPI:    Follow up and Medicare wellness exam:    Breast Mass: Patient thinks she has a lump in her right breast. She noticed it a couple days ago. It looks like a blackhead per the patient. Patient is due for a mammogram also  She is willing to do US.  mammogram order is active but has not been completed.     Prediabetes: Hgb A1c was 6.4. Denies any polyuria, polydipsia, polyphagia, vision changes or neuropathy , she has been unable to get a GLP 1 agonist for treatment in the past     Elevated LDL: recently 116 . She has made dietary changes-- including adding sea fregoso and other healthy foods     Right knee pain: The patient stated she is following with rheumatologist and was suspected  with rheumatoid sarcoidosis (negative rheumatoid factor). She no longer follows up with Dr. Harmon. She has seen pain management. She has been through PT, which did help. She does home exercises that she learned at PT.       Obesity/weight loss: has been trying to lose weight.  Patient was given a prescription for Wegovy. She does follow with a nutritionist, she has been avoiding pop, patient has been trying intermittent fasting also. The patient does walk for 30 minutes daily, though swimming seems to do better for her knees. No longer drinking sodas or eating fried foods.  Patient is on food for life program. Patient has not been able to get Mounjaro. She has been trying to lose weight. GLP 1 agonists are apparently not covered except if the patient has DM.  She stated she lost 5 Ib in the last 2 weeks.   She is hoping to be started on Monjaro  "with her current A1C and high LDL.     Anemia: Hemoglobin 11.9 -- patient states it runs in her family. She is willing to try iron pills. She no longer has menses     RA/ Sarcoidosis arthritis: patient is taking Plaquenil per Dr Lundy now, is taking folate also. She follows up with rheumatology     Asthma: Currently uses her albuterol inhaler as needed. Breo is very helpful     Depression/insomnia: she is going to [facility], sees Alyssa Avitia. Currently on Cymbalta and trazadone. Her mood has been stable. She is worried about her health. States that the Cymbalta does help with her pain.      Vitamin D deficiency: patient takes vitamin d on/off. Vitamin D level low at 23.        Patient Care Team:  Serge Marie MD as PCP - General (Internal Medicine)  Serge Marie MD as PCP - [employer] PCP     Review of Systems  otherwise negative aside from what was mentioned above in HPI.       Objective   Vitals:  /74 (BP Location: Left arm, Patient Position: Sitting, BP Cuff Size: Large adult)   Pulse 74   Temp 36.3 °C (97.4 °F) (Temporal)   Resp 16   Ht 1.575 m (5' 2\")   Wt 111 kg (244 lb)   SpO2 98%   BMI 44.63 kg/m²       Physical Exam    CONSTITUTIONAL - well nourished, well developed, looks like stated age, in no acute distress, not ill-appearing, and not tired appearing, she is morbidly obese  SKIN - normal skin color and pigmentation, normal skin turgor without rash, lesions, or nodules visualized on exposed skin  HEAD - no trauma, normocephalic  EYES - extraocular muscles are intact, and normal external exam  Neck - supple, normal auricles, no thyroid masses , no neck masses noted  CHEST - clear to auscultation, no wheezing, no crackles and no rales, good effort  CARDIAC - regular rate and regular rhythm, no skipped beats, no murmur, no carotid bruits noted,  ABDOMEN - obese  BREAST -  right breast examined, chaperone present during exam. (Kelsie \"Indira\" Prerna). No " palpable breast masses or tenderness is noted. A superficial slightly pigmented lesion superior to the  areola at approximately 10 o'clock. It may be cystic. No other lesions are palpable. Patient does breast self exam weekly     EXTREMITIES - no edema, no deformities  PSYCHIATRIC - alert, pleasant and cordial, age-appropriate  IMMUNOLOGIC - no cervical lymphadenopathy     Assessment & Plan  Vitamin D deficiency  Increase the dose of vitamin d as ordered  Orders:    cyanocobalamin, vitamin B-12, (Vitamin B-12) 1,000 mcg tablet extended release; Take 1 tablet (1,000 mcg) by mouth once daily.    folic acid (Folvite) 1 mg tablet; Take 1 tablet (1 mg) by mouth once daily.    CBC and Auto Differential; Future    Comprehensive Metabolic Panel; Future    Vitamin D 25-Hydroxy,Total (for eval of Vitamin D levels); Future    cholecalciferol (Vitamin D-3) 50 mcg (2,000 unit) capsule; Take 2 capsules (100 mcg) by mouth once daily.    Vitamin B12 deficiency  stable  Orders:    cyanocobalamin, vitamin B-12, (Vitamin B-12) 1,000 mcg tablet extended release; Take 1 tablet (1,000 mcg) by mouth once daily.    folic acid (Folvite) 1 mg tablet; Take 1 tablet (1 mg) by mouth once daily.    CBC and Auto Differential; Future    Comprehensive Metabolic Panel; Future    Vitamin B12; Future    Anemia, unspecified type  stable  Orders:    ferrous gluconate (Fergon) 324 (38 Fe) mg tablet; Take 1 tablet (38 mg of iron) by mouth every other day.    CBC and Auto Differential; Future    Comprehensive Metabolic Panel; Future    Routine general medical examination at health care facility  Medicare wellness exam completed  Orders:    1 Year Follow Up In Primary Care - Wellness Exam; Future    CBC and Auto Differential; Future    Comprehensive Metabolic Panel; Future    Obesity, Class III, BMI 40-49.9 (morbid obesity) (Multi)  Encourage weight loss, she has tried multiple diets, she has now stopped pop consumption  Orders:    CBC and Auto  Differential; Future    Comprehensive Metabolic Panel; Future    Lipid Panel; Future    Referral to Clinical Pharmacy; Future    Prediabetes  HgbA1C is 6.4, patient does need to lose weight, refer to clinical pharmacy to evaluate options at this time  Orders:    Albumin-Creatinine Ratio, Urine Random; Future    CBC and Auto Differential; Future    Comprehensive Metabolic Panel; Future    Hemoglobin A1C; Future    Lipid Panel; Future    Referral to Clinical Pharmacy; Future    Rheumatoid arthritis involving both hands with negative rheumatoid factor (Multi)  On Plaquenil, sees rheumatology  Orders:    CBC and Auto Differential; Future    Comprehensive Metabolic Panel; Future    Osteoarthritis of right knee, unspecified osteoarthritis type  Encourage exercise therapy, will encourage weight loss also  Orders:    CBC and Auto Differential; Future    Comprehensive Metabolic Panel; Future    Major depressive disorder, recurrent, moderate  She is stable  Orders:    CBC and Auto Differential; Future    Comprehensive Metabolic Panel; Future    Chronic obstructive pulmonary disease, unspecified COPD type (Multi)  She is stable, continue Breo  Orders:    CBC and Auto Differential; Future    Comprehensive Metabolic Panel; Future    Skin lesion of breast  Patient should get mammogram completed, it is already ordered, will order breast US also. Follow up with gyn to recheck the breasts also.   Orders:    CBC and Auto Differential; Future    Comprehensive Metabolic Panel; Future    BI US breast limited right; Future    Mild asthma without complication, unspecified whether persistent (HHS-HCC)  Continue current inhaler therapies  Medicare wellness exam completed, recheck labs as ordered, talk to clinical pharmacy

## 2025-04-03 NOTE — ASSESSMENT & PLAN NOTE
Patient should get mammogram completed, it is already ordered, will order breast US also. Follow up with gyn to recheck the breasts also.   Orders:    CBC and Auto Differential; Future    Comprehensive Metabolic Panel; Future    BI US breast limited right; Future

## 2025-04-08 ENCOUNTER — HOSPITAL ENCOUNTER (OUTPATIENT)
Dept: RADIOLOGY | Facility: HOSPITAL | Age: 50
Discharge: HOME | End: 2025-04-08
Payer: MEDICARE

## 2025-04-08 VITALS — WEIGHT: 244 LBS | BODY MASS INDEX: 44.9 KG/M2 | HEIGHT: 62 IN

## 2025-04-08 DIAGNOSIS — N63.41 LUMP IN CENTRAL PORTION OF RIGHT BREAST: ICD-10-CM

## 2025-04-08 DIAGNOSIS — L98.8 SKIN LESION OF BREAST: ICD-10-CM

## 2025-04-08 DIAGNOSIS — Z12.31 SCREENING MAMMOGRAM FOR BREAST CANCER: ICD-10-CM

## 2025-04-08 PROCEDURE — 77062 BREAST TOMOSYNTHESIS BI: CPT

## 2025-04-08 PROCEDURE — 76642 ULTRASOUND BREAST LIMITED: CPT | Mod: RT

## 2025-04-15 NOTE — PROGRESS NOTES
Pharmacist Clinic: Weight Management    Alexander Marin was referred to the Clinical Pharmacy Team for weight management.     Referring Provider: Serge Marie, *  - Last visit with referring provider: 4/8/25    Subjective      HISTORY OF PRESENT ILLNESS  - PMH of prediabetes    WEIGHT LOSS    Current Weight Loss Pharmacotherapy:  - None     Adverse events: N/A    Weight Loss Measurements:   - Baseline weight: 244 lbs  - Current weight: 244 lbs    BMI Readings from Last 3 Encounters:   04/08/25 44.63 kg/m²   04/03/25 44.63 kg/m²   12/16/24 45.54 kg/m²        Diet:  - Cut out breakfast  - Eats 2 meals a day    Exercise:   - Did not discuss    Other Potential Contributing Factors  Comorbidities: Comorbidities: asthmausers daily inhaler/medication , depressed moodtakes medications, and knee paintakes NSAIDs    Pertinent PMH Review:  PMH of Pancreatitis: Did not discuss  PMH of Retinopathy: Did not discuss  PMH of MTC: Did not discuss  PMH of MEN2: Did not discuss  PMH of Diabetes: No  PMH of Sleep Apnea: No  PMH of ASCVD: No    Non-Pharmacological Therapy  Weight loss techniques attempted:  Self direct diet     Insurance coverage of weight-loss medications? No  Eligible for copay cards/programs? No  Eligible for  PAP? N/A    Drug Interactions:  - No significant drug-drug interactions exist that require adjustment to therapy    Objective    Allergies: Bee venom protein (honey bee), Fish containing products, Naproxen, Sulfamethoxazole-trimethoprim, Watermelon, Meperidine, and Promethazine     [facility] [address] [place] [address] [address] [facility] [address] [address]  Phone: [phone] Fax: [phone]    [facility] (Now [facility]) - [place], [place] - [address] [facility]  [address] [address]  Phone: [phone] Fax: [phone]     [facility]  [address] [address]  Phone: [phone] Fax:  [phone]    [facility] #[id number] [place], [place] - [address] OF [facility]  [address] [facility] [address]  Phone: [phone] Fax: [phone]      LAB  Lab Results   Component Value Date    BILITOT 0.4 03/26/2025    CALCIUM 9.1 03/26/2025    CO2 28 03/26/2025     03/26/2025    CREATININE 0.71 03/26/2025    GLUCOSE 95 03/26/2025    ALKPHOS 83 03/26/2025    K 3.9 03/26/2025    PROT 7.5 03/26/2025     03/26/2025    AST 13 03/26/2025    ALT 12 03/26/2025    BUN 13 03/26/2025    ANIONGAP 9 03/26/2025    ALBUMIN 4.3 03/26/2025    LIPASE 18 02/20/2021    EGFR 103 03/26/2025     Lab Results   Component Value Date    TRIG 90 03/26/2025    CHOL 186 03/26/2025    LDLCALC 116 (H) 03/26/2025    HDL 51 03/26/2025     Lab Results   Component Value Date    HGBA1C 6.4 (H) 03/26/2025       Current Outpatient Medications on File Prior to Visit   Medication Sig Dispense Refill    albuterol 90 mcg/actuation inhaler INHALE 1 PUFF BY MOUTH EVERY 4 HOURS AS NEEDED 8.5 g 5    cholecalciferol (Vitamin D-3) 50 mcg (2,000 unit) capsule Take 2 capsules (100 mcg) by mouth once daily. 180 capsule 1    cyanocobalamin, vitamin B-12, (Vitamin B-12) 1,000 mcg tablet extended release Take 1 tablet (1,000 mcg) by mouth once daily. 90 tablet 1    DULoxetine (Cymbalta) 30 mg DR capsule Take 1 capsule (30 mg) by mouth once daily. Do not crush or chew.      DULoxetine (Cymbalta) 60 mg DR capsule Take 1 capsule (60 mg) by mouth once daily. Unknown of up dosage      ferrous gluconate (Fergon) 324 (38 Fe) mg tablet Take 1 tablet (38 mg of iron) by mouth every other day. 45 tablet 1    fluticasone furoate-vilanteroL (Breo Ellipta) 100-25 mcg/dose inhaler use 1 inhalation BY MOUTH EVERY DAY 60 each 3    folic acid (Folvite) 1 mg tablet Take 1 tablet (1 mg) by mouth once daily. 90 tablet 1    hydroxychloroquine (Plaquenil) 200 mg tablet Take 1 tablet (200 mg) by mouth 2 times a day.      meloxicam (Mobic) 15 mg tablet Take  1 tablet (15 mg) by mouth once daily.      traZODone (Desyrel) 100 mg tablet TAKE 1/2 (ONE-HALF) TO 1 (ONE) TABLET BY MOUTH FOR HEADACHE AS NEEDED for sleep      turmeric root extract 500 mg tablet Take by mouth.       No current facility-administered medications on file prior to visit.       PATIENT EDUCATION/GOALS  - Target goal 5% to 10% weight loss within 3-6 months  - Counseled patient on MOA, expectations, side effects, duration of therapy, contraindications, administration, and monitoring parameters  - Answered all patient questions and concerns; provided [facility] phone number if issues/questions arise    Assessment/Plan    Problem List Items Addressed This Visit    None        ASSESSMENT:  Patient with baseline BMI = 44.63 interested in GLP-1 agonist therapy for weight loss.    Patient referred for weight management. Unfortunately insurance does not cover any weight loss meds as she has Medicaid/Medicare. She is considered pre-diabetic with A1c of 6.4% so therefore, GLP not covered at this time. If she ever gets to diabetic range, will have Dr. RAY refer back to me. Otherwise will follow PRN for now.    PLAN:  Clinical Pharmacist follow up: PRN  PCP follow up: 10/7/25    Continue all meds under the continuation of care with the referring provider and clinical pharmacy team.    Thank you,  Nancy Hull, PharmD  Clinical Pharmacy Specialist  [phone]  [email or web address]     Verbal consent to manage patient's drug therapy was obtained from patient. They were informed they may decline to participate or withdraw from participation in pharmacy services at any time.

## 2025-04-16 ENCOUNTER — APPOINTMENT (OUTPATIENT)
Dept: PHARMACY | Facility: HOSPITAL | Age: 50
End: 2025-04-16
Payer: COMMERCIAL

## 2025-04-16 DIAGNOSIS — E66.01 OBESITY, CLASS III, BMI 40-49.9 (MORBID OBESITY) (MULTI): ICD-10-CM

## 2025-04-16 DIAGNOSIS — R73.03 PREDIABETES: ICD-10-CM

## 2025-05-06 ENCOUNTER — CLINICAL SUPPORT (OUTPATIENT)
Dept: NUTRITION | Facility: HOSPITAL | Age: 50
End: 2025-05-06
Payer: COMMERCIAL

## 2025-05-06 NOTE — PROGRESS NOTES
[facility]  Diet Recommendation 1: Heart Healthy  Diet Recommendation 2: Healthy Eating  Other Diet Recommendations: Prediabetes (A1C 6.4)  Food Intolerance Avoidance: Allergic to fresh fish but can tolerate canned per pt  Nutrition Goals Stated: Wt loss and lower A1C  Household Size: 7 Members (4 Max/Household)  Interventions: Referral Number: 6th 6 Mo Referral 3 yrs (Referrals may not be consecutive)  Interventions: Visit Number: 2 of 6 Visits - Max 6 Visits/Referral Each 6 Mo Period  Education Today: Carbohydrate Counting  Follow Up Notes for Future Visits: Getting A1C rechecked in a month or two. Has made even more changes to diet. Fasts for 16hours, only has two meals qd- lunch and an early dinner. Discussed that frequent mini meals are often recommended for blood sugar control. She is also drinking various vegetable juice, apple cider vinegar drinks that her friend recommended making. Says wt loss surgery was recommended to her but she is not interested. Gets food for her grandkids as well- discussed no honey before age of 1.  Grains: 50-75% Whole  Fruit: 50-75% Fresh  Vegetables: % Fresh  Proteins: 1-2 Plant-based Items  Dairy: Lowfat - 100%  Originating Site of Referral Order: Serge Marie MD  Initials of RD Assisting Today: [place]

## 2025-06-03 DIAGNOSIS — F33.1 MAJOR DEPRESSIVE DISORDER, RECURRENT, MODERATE: ICD-10-CM

## 2025-06-03 DIAGNOSIS — E55.9 VITAMIN D DEFICIENCY: ICD-10-CM

## 2025-06-03 DIAGNOSIS — Z00.00 ROUTINE GENERAL MEDICAL EXAMINATION AT HEALTH CARE FACILITY: ICD-10-CM

## 2025-06-03 DIAGNOSIS — M17.11 OSTEOARTHRITIS OF RIGHT KNEE, UNSPECIFIED OSTEOARTHRITIS TYPE: ICD-10-CM

## 2025-06-03 DIAGNOSIS — D64.9 ANEMIA, UNSPECIFIED TYPE: ICD-10-CM

## 2025-06-03 DIAGNOSIS — E53.8 VITAMIN B12 DEFICIENCY: ICD-10-CM

## 2025-06-03 DIAGNOSIS — L98.8 SKIN LESION OF BREAST: ICD-10-CM

## 2025-06-03 DIAGNOSIS — J44.9 CHRONIC OBSTRUCTIVE PULMONARY DISEASE, UNSPECIFIED COPD TYPE (MULTI): ICD-10-CM

## 2025-06-03 DIAGNOSIS — M06.042 RHEUMATOID ARTHRITIS INVOLVING BOTH HANDS WITH NEGATIVE RHEUMATOID FACTOR (MULTI): ICD-10-CM

## 2025-06-03 DIAGNOSIS — M06.041 RHEUMATOID ARTHRITIS INVOLVING BOTH HANDS WITH NEGATIVE RHEUMATOID FACTOR (MULTI): ICD-10-CM

## 2025-06-03 DIAGNOSIS — E66.813 OBESITY, CLASS III, BMI 40-49.9 (MORBID OBESITY): ICD-10-CM

## 2025-06-03 DIAGNOSIS — R73.03 PREDIABETES: ICD-10-CM

## 2025-06-11 ENCOUNTER — CLINICAL SUPPORT (OUTPATIENT)
Dept: NUTRITION | Facility: HOSPITAL | Age: 50
End: 2025-06-11
Payer: COMMERCIAL

## 2025-06-11 NOTE — PROGRESS NOTES
[facility]  Diet Recommendation 1: Heart Healthy  Diet Recommendation 2: Healthy Eating  Other Diet Recommendations: Prediabetes (A1C 6.4)  Food Intolerance Avoidance: Allergic to fresh fish but can tolerate canned per pt. Also cannot do watermelon  Nutrition Goals Stated: Wt loss and lower A1C  Household Size: 7 Members (4 Max/Household)  Interventions: Referral Number: 6th 6 Mo Referral 3 yrs (Referrals may not be consecutive)  Interventions: Visit Number: 3 of 6 Visits - Max 6 Visits/Referral Each 6 Mo Period  Other Interventions: Took protein powder  Education Today: Healthy Recipes  Follow Up Notes for Future Visits: Gave [employer] coupons and discussed kids summer lunch propram. A1C will be checked next month. Still fasting, 2 meals qd and wants to limit to 1. Discussed that this may not be the best choice for blood sugar control, but she feels she has higher energy/better skin, etc. Continues juicing for herself and grandkids. I plan to discuss smoothies at next visit for higher fiber.  Grains: 50-75% Whole  Fruit: 50-75% Fresh  Vegetables: % Fresh  Proteins: 3 Plant-based Items  Dairy: 50-75% Lowfat  Originating Site of Referral Order: Serge Marie MD  Initials of RD Assisting Today: [place]

## 2025-06-21 DIAGNOSIS — J45.909 MILD ASTHMA WITHOUT COMPLICATION, UNSPECIFIED WHETHER PERSISTENT (HHS-HCC): ICD-10-CM

## 2025-06-23 RX ORDER — FLUTICASONE FUROATE AND VILANTEROL TRIFENATATE 100; 25 UG/1; UG/1
POWDER RESPIRATORY (INHALATION)
Qty: 60 EACH | Refills: 3 | Status: SHIPPED | OUTPATIENT
Start: 2025-06-23

## 2025-07-03 DIAGNOSIS — E53.8 VITAMIN B12 DEFICIENCY: ICD-10-CM

## 2025-07-18 ENCOUNTER — CLINICAL SUPPORT (OUTPATIENT)
Dept: NUTRITION | Facility: HOSPITAL | Age: 50
End: 2025-07-18
Payer: COMMERCIAL

## 2025-07-18 NOTE — PROGRESS NOTES
[facility]  Diet Recommendation 1: Heart Healthy  Diet Recommendation 2: Healthy Eating  Other Diet Recommendations: Prediabetes (A1C 6.4)  Food Intolerance Avoidance: Allergic to fresh fish but can tolerate canned per pt. Also cannot do watermelon  Nutrition Goals Stated: Wt loss and lower A1C (avoid medication)  Household Size: 7 Members (4 Max/Household)  Interventions: Referral Number: 6th 6 Mo Referral 3 yrs (Referrals may not be consecutive)  Interventions: Visit Number: 4 of 6 Visits - Max 6 Visits/Referral Each 6 Mo Period  Education Today: [employer] Meals  Follow Up Notes for Future Visits: Pt weighed herself in office today- has lost wt  Grains: 25-50% Whole  Fruit: % Fresh  Vegetables: 25-50% Fresh  Proteins: 0 Plant-based Items  Dairy: 50-75% Lowfat  Originating Site of Referral Order: Serge Marie MD  Initials of RD Assisting Today: [place]

## 2025-07-19 DIAGNOSIS — J45.909 UNSPECIFIED ASTHMA, UNCOMPLICATED (HHS-HCC): ICD-10-CM

## 2025-07-21 RX ORDER — ALBUTEROL SULFATE 90 UG/1
1 INHALANT RESPIRATORY (INHALATION) EVERY 4 HOURS PRN
Qty: 8.5 G | Refills: 5 | Status: SHIPPED | OUTPATIENT
Start: 2025-07-21

## 2025-08-02 ENCOUNTER — HOSPITAL ENCOUNTER (OUTPATIENT)
Dept: RADIOLOGY | Facility: HOSPITAL | Age: 50
Discharge: HOME | End: 2025-08-02
Payer: MEDICARE

## 2025-08-02 DIAGNOSIS — M23.91 UNSPECIFIED INTERNAL DERANGEMENT OF RIGHT KNEE: ICD-10-CM

## 2025-08-02 PROCEDURE — 73564 X-RAY EXAM KNEE 4 OR MORE: CPT | Mod: RIGHT SIDE | Performed by: RADIOLOGY

## 2025-08-02 PROCEDURE — 73564 X-RAY EXAM KNEE 4 OR MORE: CPT | Mod: RT

## 2025-08-27 ENCOUNTER — CLINICAL SUPPORT (OUTPATIENT)
Dept: NUTRITION | Facility: HOSPITAL | Age: 50
End: 2025-08-27
Payer: COMMERCIAL

## 2025-10-02 ENCOUNTER — APPOINTMENT (OUTPATIENT)
Dept: NUTRITION | Facility: HOSPITAL | Age: 50
End: 2025-10-02
Payer: COMMERCIAL

## 2025-10-07 ENCOUNTER — APPOINTMENT (OUTPATIENT)
Dept: PRIMARY CARE | Facility: CLINIC | Age: 50
End: 2025-10-07
Payer: COMMERCIAL

## (undated) DEVICE — SHEATH, INTRODUCER MICRO 7FR F/VENOUS CLOSURE

## (undated) DEVICE — COVER PROBE, SOFT FLEX W/ GEL, 5 X 48 IN (13X122CM)

## (undated) DEVICE — BANDAGE, GAUZE, CONFORMING, KERLIX, 6 PLY, 4.5 IN X 4.1 YD

## (undated) DEVICE — SUTURE, SILK, 2-0, 30 IN, SH, BLACK

## (undated) DEVICE — TUBING, SINGLE SPIKE INFILTRATION, 15.5 FEET

## (undated) DEVICE — DRAPE, SHEET, THREE QUARTER, FAN FOLD, 57 X 77 IN

## (undated) DEVICE — SUTURE, MONOCRYL, 4-0, 18 IN, PS2, UNDYED

## (undated) DEVICE — BANDAGE, COMPRESSION, EZE-BAND, DBL, 6 X 11 YDS

## (undated) DEVICE — SUTURE, MONOCRYL, 3-0, PS-1 27IN, UNDYED

## (undated) DEVICE — CLOSURE, VENOUS, W/CATHETER, 7 FR, 60 CM

## (undated) DEVICE — GLOVE, PROTEXIS PI CLASSIC, SZ-7.0, PF, LF

## (undated) DEVICE — INTRODUCER SET, MICROPUNCTURE, W/NITINOL GUIDEWIRE, 5 FR X 10 CM

## (undated) DEVICE — CLOSURE, FAST PROCEDURE PACK, 100CM

## (undated) DEVICE — DRESSING, GAUZE, SPONGE, VERSALON, ALL PURPOSE, 4 X 4 IN, SOFT

## (undated) DEVICE — LABELING SYSTEM, CORRECT MEDICATION, CATH LAB

## (undated) DEVICE — APPLICATOR, CHLORAPREP, W/ORANGE TINT, 26ML

## (undated) DEVICE — SYRINGE, HYPODERMIC, LEUR LOCK, 3 CC, PLASTIC, STERILE